# Patient Record
Sex: MALE | Race: WHITE | NOT HISPANIC OR LATINO | Employment: OTHER | URBAN - METROPOLITAN AREA
[De-identification: names, ages, dates, MRNs, and addresses within clinical notes are randomized per-mention and may not be internally consistent; named-entity substitution may affect disease eponyms.]

---

## 2017-02-16 ENCOUNTER — HOSPITAL ENCOUNTER (OUTPATIENT)
Dept: RADIOLOGY | Facility: MEDICAL CENTER | Age: 67
End: 2017-02-16

## 2017-03-09 PROBLEM — J18.9 HOSPITAL-ACQUIRED PNEUMONIA: Status: ACTIVE | Noted: 2017-03-09

## 2017-03-09 PROBLEM — R09.02 HYPOXEMIA: Status: ACTIVE | Noted: 2017-03-09
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
## 2020-07-26 ENCOUNTER — HOSPITAL ENCOUNTER (INPATIENT)
Facility: MEDICAL CENTER | Age: 70
LOS: 1 days | DRG: 184 | End: 2020-07-27
Attending: EMERGENCY MEDICINE | Admitting: SURGERY
Payer: MEDICARE

## 2020-07-26 ENCOUNTER — APPOINTMENT (OUTPATIENT)
Dept: RADIOLOGY | Facility: MEDICAL CENTER | Age: 70
DRG: 184 | End: 2020-07-26
Attending: EMERGENCY MEDICINE
Payer: MEDICARE

## 2020-07-26 ENCOUNTER — APPOINTMENT (OUTPATIENT)
Dept: RADIOLOGY | Facility: MEDICAL CENTER | Age: 70
DRG: 184 | End: 2020-07-26
Payer: MEDICARE

## 2020-07-26 DIAGNOSIS — S09.90XA CLOSED HEAD INJURY, INITIAL ENCOUNTER: ICD-10-CM

## 2020-07-26 DIAGNOSIS — S22.42XA CLOSED FRACTURE OF MULTIPLE RIBS OF LEFT SIDE, INITIAL ENCOUNTER: ICD-10-CM
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
## 2020-07-26 PROBLEM — S06.0X1A CONCUSSION WITH LOSS OF CONSCIOUSNESS OF 30 MINUTES OR LESS: Status: ACTIVE | Noted: 2020-07-26

## 2020-07-26 PROBLEM — Z11.9 SCREENING EXAMINATION FOR INFECTIOUS DISEASE: Status: ACTIVE | Noted: 2020-07-26

## 2020-07-26 PROBLEM — Z53.09 CONTRAINDICATION TO DEEP VEIN THROMBOSIS (DVT) PROPHYLAXIS: Status: ACTIVE | Noted: 2020-07-26

## 2020-07-26 PROBLEM — S22.43XA CLOSED FRACTURE OF MULTIPLE RIBS OF BOTH SIDES: Status: ACTIVE | Noted: 2020-07-26

## 2020-07-26 PROBLEM — T14.90XA TRAUMA: Status: ACTIVE | Noted: 2020-07-26

## 2020-07-26 LAB
ABO GROUP BLD: NORMAL
ALBUMIN SERPL BCP-MCNC: 4.2 G/DL (ref 3.2–4.9)
ALBUMIN/GLOB SERPL: 1.6 G/DL
ALP SERPL-CCNC: 64 U/L (ref 30–99)
ALT SERPL-CCNC: 16 U/L (ref 2–50)
ANION GAP SERPL CALC-SCNC: 13 MMOL/L (ref 7–16)
APTT PPP: 29.6 SEC (ref 24.7–36)
AST SERPL-CCNC: 20 U/L (ref 12–45)
BILIRUB SERPL-MCNC: 0.5 MG/DL (ref 0.1–1.5)
BLD GP AB SCN SERPL QL: NORMAL
BUN SERPL-MCNC: 26 MG/DL (ref 8–22)
CALCIUM SERPL-MCNC: 9 MG/DL (ref 8.5–10.5)
CHLORIDE SERPL-SCNC: 102 MMOL/L (ref 96–112)
CO2 SERPL-SCNC: 22 MMOL/L (ref 20–33)
COVID ORDER STATUS COVID19: NORMAL
CREAT SERPL-MCNC: 0.87 MG/DL (ref 0.5–1.4)
ERYTHROCYTE [DISTWIDTH] IN BLOOD BY AUTOMATED COUNT: 48.7 FL (ref 35.9–50)
ETHANOL BLD-MCNC: <10.1 MG/DL (ref 0–10.1)
GLOBULIN SER CALC-MCNC: 2.7 G/DL (ref 1.9–3.5)
GLUCOSE SERPL-MCNC: 110 MG/DL (ref 65–99)
HCT VFR BLD AUTO: 48.3 % (ref 42–52)
HGB BLD-MCNC: 15.9 G/DL (ref 14–18)
INR PPP: 1.02 (ref 0.87–1.13)
MCH RBC QN AUTO: 32.8 PG (ref 27–33)
MCHC RBC AUTO-ENTMCNC: 32.9 G/DL (ref 33.7–35.3)
MCV RBC AUTO: 99.6 FL (ref 81.4–97.8)
PLATELET # BLD AUTO: 218 K/UL (ref 164–446)
PMV BLD AUTO: 10.1 FL (ref 9–12.9)
POTASSIUM SERPL-SCNC: 4.3 MMOL/L (ref 3.6–5.5)
PROT SERPL-MCNC: 6.9 G/DL (ref 6–8.2)
PROTHROMBIN TIME: 13.7 SEC (ref 12–14.6)
RBC # BLD AUTO: 4.85 M/UL (ref 4.7–6.1)
RH BLD: NORMAL
SARS-COV-2 RNA RESP QL NAA+PROBE: NOTDETECTED
SODIUM SERPL-SCNC: 137 MMOL/L (ref 135–145)
SPECIMEN SOURCE: NORMAL
WBC # BLD AUTO: 8.9 K/UL (ref 4.8–10.8)

## 2020-07-26 PROCEDURE — 80307 DRUG TEST PRSMV CHEM ANLYZR: CPT

## 2020-07-26 PROCEDURE — 72128 CT CHEST SPINE W/O DYE: CPT

## 2020-07-26 PROCEDURE — 700117 HCHG RX CONTRAST REV CODE 255: Performed by: EMERGENCY MEDICINE

## 2020-07-26 PROCEDURE — 74177 CT ABD & PELVIS W/CONTRAST: CPT

## 2020-07-26 PROCEDURE — 99291 CRITICAL CARE FIRST HOUR: CPT

## 2020-07-26 PROCEDURE — 70450 CT HEAD/BRAIN W/O DYE: CPT

## 2020-07-26 PROCEDURE — 80053 COMPREHEN METABOLIC PANEL: CPT

## 2020-07-26 PROCEDURE — 700112 HCHG RX REV CODE 229: Performed by: NURSE PRACTITIONER

## 2020-07-26 PROCEDURE — 700111 HCHG RX REV CODE 636 W/ 250 OVERRIDE (IP): Performed by: EMERGENCY MEDICINE

## 2020-07-26 PROCEDURE — G0390 TRAUMA RESPONS W/HOSP CRITI: HCPCS

## 2020-07-26 PROCEDURE — 770022 HCHG ROOM/CARE - ICU (200)

## 2020-07-26 PROCEDURE — 72131 CT LUMBAR SPINE W/O DYE: CPT

## 2020-07-26 PROCEDURE — 96375 TX/PRO/DX INJ NEW DRUG ADDON: CPT

## 2020-07-26 PROCEDURE — 85027 COMPLETE CBC AUTOMATED: CPT

## 2020-07-26 PROCEDURE — 86850 RBC ANTIBODY SCREEN: CPT

## 2020-07-26 PROCEDURE — 700111 HCHG RX REV CODE 636 W/ 250 OVERRIDE (IP): Performed by: NURSE PRACTITIONER

## 2020-07-26 PROCEDURE — U0003 INFECTIOUS AGENT DETECTION BY NUCLEIC ACID (DNA OR RNA); SEVERE ACUTE RESPIRATORY SYNDROME CORONAVIRUS 2 (SARS-COV-2) (CORONAVIRUS DISEASE [COVID-19]), AMPLIFIED PROBE TECHNIQUE, MAKING USE OF HIGH THROUGHPUT TECHNOLOGIES AS DESCRIBED BY CMS-2020-01-R: HCPCS

## 2020-07-26 PROCEDURE — A9270 NON-COVERED ITEM OR SERVICE: HCPCS | Performed by: NURSE PRACTITIONER

## 2020-07-26 PROCEDURE — 72125 CT NECK SPINE W/O DYE: CPT

## 2020-07-26 PROCEDURE — 85610 PROTHROMBIN TIME: CPT

## 2020-07-26 PROCEDURE — C9803 HOPD COVID-19 SPEC COLLECT: HCPCS | Performed by: NURSE PRACTITIONER

## 2020-07-26 PROCEDURE — 700102 HCHG RX REV CODE 250 W/ 637 OVERRIDE(OP): Performed by: NURSE PRACTITIONER

## 2020-07-26 PROCEDURE — 86900 BLOOD TYPING SEROLOGIC ABO: CPT

## 2020-07-26 PROCEDURE — 96374 THER/PROPH/DIAG INJ IV PUSH: CPT

## 2020-07-26 PROCEDURE — 86901 BLOOD TYPING SEROLOGIC RH(D): CPT

## 2020-07-26 PROCEDURE — 85730 THROMBOPLASTIN TIME PARTIAL: CPT

## 2020-07-26 PROCEDURE — 71045 X-RAY EXAM CHEST 1 VIEW: CPT

## 2020-07-26 PROCEDURE — 700101 HCHG RX REV CODE 250: Performed by: NURSE PRACTITIONER

## 2020-07-26 RX ORDER — POLYETHYLENE GLYCOL 3350 17 G/17G
1 POWDER, FOR SOLUTION ORAL 2 TIMES DAILY
Status: DISCONTINUED | OUTPATIENT
Start: 2020-07-26 | End: 2020-07-27 | Stop reason: HOSPADM

## 2020-07-26 RX ORDER — MORPHINE SULFATE 4 MG/ML
4 INJECTION, SOLUTION INTRAMUSCULAR; INTRAVENOUS ONCE
Status: COMPLETED | OUTPATIENT
Start: 2020-07-26 | End: 2020-07-26

## 2020-07-26 RX ORDER — LATANOPROST 50 UG/ML
1 SOLUTION/ DROPS OPHTHALMIC
Status: DISCONTINUED | OUTPATIENT
Start: 2020-07-26 | End: 2020-07-27 | Stop reason: HOSPADM

## 2020-07-26 RX ORDER — ONDANSETRON 2 MG/ML
4 INJECTION INTRAMUSCULAR; INTRAVENOUS EVERY 4 HOURS PRN
Status: DISCONTINUED | OUTPATIENT
Start: 2020-07-26 | End: 2020-07-27 | Stop reason: HOSPADM

## 2020-07-26 RX ORDER — ACETAMINOPHEN 325 MG/1
650 TABLET ORAL EVERY 6 HOURS
Status: DISCONTINUED | OUTPATIENT
Start: 2020-07-26 | End: 2020-07-27 | Stop reason: HOSPADM

## 2020-07-26 RX ORDER — AMOXICILLIN 250 MG
1 CAPSULE ORAL NIGHTLY
Status: DISCONTINUED | OUTPATIENT
Start: 2020-07-26 | End: 2020-07-27 | Stop reason: HOSPADM

## 2020-07-26 RX ORDER — HYDROMORPHONE HYDROCHLORIDE 1 MG/ML
0.5 INJECTION, SOLUTION INTRAMUSCULAR; INTRAVENOUS; SUBCUTANEOUS
Status: DISCONTINUED | OUTPATIENT
Start: 2020-07-26 | End: 2020-07-27

## 2020-07-26 RX ORDER — OXYCODONE HYDROCHLORIDE 5 MG/1
5 TABLET ORAL
Status: DISCONTINUED | OUTPATIENT
Start: 2020-07-26 | End: 2020-07-27 | Stop reason: HOSPADM

## 2020-07-26 RX ORDER — DOCUSATE SODIUM 100 MG/1
100 CAPSULE, LIQUID FILLED ORAL 2 TIMES DAILY
Status: DISCONTINUED | OUTPATIENT
Start: 2020-07-26 | End: 2020-07-27 | Stop reason: HOSPADM

## 2020-07-26 RX ORDER — METAXALONE 800 MG/1
800 TABLET ORAL 3 TIMES DAILY PRN
Status: DISCONTINUED | OUTPATIENT
Start: 2020-07-26 | End: 2020-07-27 | Stop reason: HOSPADM

## 2020-07-26 RX ORDER — BIMATOPROST 0.1 MG/ML
1 SOLUTION/ DROPS OPHTHALMIC
COMMUNITY

## 2020-07-26 RX ORDER — SODIUM CHLORIDE 9 MG/ML
INJECTION, SOLUTION INTRAVENOUS CONTINUOUS
Status: DISCONTINUED | OUTPATIENT
Start: 2020-07-26 | End: 2020-07-27

## 2020-07-26 RX ORDER — ENEMA 19; 7 G/133ML; G/133ML
1 ENEMA RECTAL
Status: DISCONTINUED | OUTPATIENT
Start: 2020-07-26 | End: 2020-07-27 | Stop reason: HOSPADM

## 2020-07-26 RX ORDER — GABAPENTIN 100 MG/1
100 CAPSULE ORAL 3 TIMES DAILY
Status: DISCONTINUED | OUTPATIENT
Start: 2020-07-27 | End: 2020-07-27 | Stop reason: HOSPADM

## 2020-07-26 RX ORDER — BISACODYL 10 MG
10 SUPPOSITORY, RECTAL RECTAL
Status: DISCONTINUED | OUTPATIENT
Start: 2020-07-26 | End: 2020-07-27 | Stop reason: HOSPADM

## 2020-07-26 RX ORDER — FAMOTIDINE 20 MG/1
20 TABLET, FILM COATED ORAL 2 TIMES DAILY
Status: DISCONTINUED | OUTPATIENT
Start: 2020-07-26 | End: 2020-07-27

## 2020-07-26 RX ORDER — ACETAMINOPHEN 500 MG
500-1000 TABLET ORAL EVERY 8 HOURS PRN
Status: ON HOLD | COMMUNITY
End: 2020-07-27

## 2020-07-26 RX ORDER — LIDOCAINE 50 MG/G
1 PATCH TOPICAL EVERY 24 HOURS
Status: DISCONTINUED | OUTPATIENT
Start: 2020-07-26 | End: 2020-07-27 | Stop reason: HOSPADM

## 2020-07-26 RX ORDER — AMOXICILLIN 250 MG
1 CAPSULE ORAL
Status: DISCONTINUED | OUTPATIENT
Start: 2020-07-26 | End: 2020-07-27 | Stop reason: HOSPADM

## 2020-07-26 RX ADMIN — LATANOPROST 1 DROP: 50 SOLUTION OPHTHALMIC at 19:45

## 2020-07-26 RX ADMIN — ACETAMINOPHEN 650 MG: 325 TABLET, FILM COATED ORAL at 22:00

## 2020-07-26 RX ADMIN — FENTANYL CITRATE 50 MCG: 50 INJECTION INTRAMUSCULAR; INTRAVENOUS at 11:18

## 2020-07-26 RX ADMIN — OXYCODONE 5 MG: 5 TABLET ORAL at 20:00

## 2020-07-26 RX ADMIN — IOHEXOL 100 ML: 350 INJECTION, SOLUTION INTRAVENOUS at 11:54

## 2020-07-26 RX ADMIN — DOCUSATE SODIUM 100 MG: 100 CAPSULE, LIQUID FILLED ORAL at 17:18

## 2020-07-26 RX ADMIN — ACETAMINOPHEN 650 MG: 325 TABLET, FILM COATED ORAL at 15:56

## 2020-07-26 RX ADMIN — MORPHINE SULFATE 4 MG: 4 INJECTION INTRAVENOUS at 13:09

## 2020-07-26 RX ADMIN — LIDOCAINE 1 PATCH: 50 PATCH TOPICAL at 15:57

## 2020-07-26 RX ADMIN — METAXALONE 800 MG: 800 TABLET ORAL at 19:47

## 2020-07-26 RX ADMIN — SENNOSIDES AND DOCUSATE SODIUM 1 TABLET: 8.6; 5 TABLET ORAL at 21:00

## 2020-07-26 RX ADMIN — HYDROMORPHONE HYDROCHLORIDE 0.5 MG: 1 INJECTION, SOLUTION INTRAMUSCULAR; INTRAVENOUS; SUBCUTANEOUS at 22:30

## 2020-07-26 RX ADMIN — OXYCODONE 5 MG: 5 TABLET ORAL at 15:56

## 2020-07-26 ASSESSMENT — COPD QUESTIONNAIRES
DURING THE PAST 4 WEEKS HOW MUCH DID YOU FEEL SHORT OF BREATH: NONE/LITTLE OF THE TIME
HAVE YOU SMOKED AT LEAST 100 CIGARETTES IN YOUR ENTIRE LIFE: NO/DON'T KNOW
COPD SCREENING SCORE: 2
DO YOU EVER COUGH UP ANY MUCUS OR PHLEGM?: NO/ONLY WITH OCCASIONAL COLDS OR INFECTIONS

## 2020-07-26 ASSESSMENT — LIFESTYLE VARIABLES
HAVE PEOPLE ANNOYED YOU BY CRITICIZING YOUR DRINKING: NO
ALCOHOL_USE: YES
TOTAL SCORE: 0
ON A TYPICAL DAY WHEN YOU DRINK ALCOHOL HOW MANY DRINKS DO YOU HAVE: 1
EVER HAD A DRINK FIRST THING IN THE MORNING TO STEADY YOUR NERVES TO GET RID OF A HANGOVER: NO
TOTAL SCORE: 0
AVERAGE NUMBER OF DAYS PER WEEK YOU HAVE A DRINK CONTAINING ALCOHOL: 5
HOW MANY TIMES IN THE PAST YEAR HAVE YOU HAD 5 OR MORE DRINKS IN A DAY: 0
CONSUMPTION TOTAL: NEGATIVE
EVER_SMOKED: NEVER
TOTAL SCORE: 0
EVER_SMOKED: NEVER
EVER FELT BAD OR GUILTY ABOUT YOUR DRINKING: NO
HAVE YOU EVER FELT YOU SHOULD CUT DOWN ON YOUR DRINKING: NO

## 2020-07-26 ASSESSMENT — FIBROSIS 4 INDEX: FIB4 SCORE: 1.58

## 2020-07-26 ASSESSMENT — PAIN DESCRIPTION - DESCRIPTORS: DESCRIPTORS: ACHING

## 2020-07-26 NOTE — PROGRESS NOTES
1523- Patient arrived to SICU rm 117 on monitor with 2 ACLS RNs. Patient settled. Patient pleasant but slightly confused.     Belongings:  -Phone and   -Cowboy boots  -Pocket knife  -Clothing  -IPAD  -Books  -Reading glasses    Skin check:  Scab on right ear  Small scab to top of right foot  Bruising to right temporal region of head  Small red area on RLQ

## 2020-07-26 NOTE — ASSESSMENT & PLAN NOTE
Acute left posterior and lateral 5th rib fractures as well as acute left lateral 3rd and 4th, 6th, and 7th rib fractures. None of these are significantly displaced.  Aggressive multimodal pain management and pulmonary hygiene.  Serial chest radiographs.

## 2020-07-26 NOTE — DISCHARGE PLANNING
Medical Social Work    LSW was notified that pt asking about his wife but pt not able to give wife's contact info. LSW completed [employer] Website search and located pt's wife, Arleth Meeks ([phone]). LSW spoke w/ Arleth who reports that they live 2 hours away and are currently driving to [facility]. Arleth estimates she will be here in an hour.

## 2020-07-26 NOTE — ASSESSMENT & PLAN NOTE
Systemic anticoagulation contraindicated secondary to elevated bleeding risk.  Consider surveillance venous duplex scanning if unable to start Lovenox in 48 hours.

## 2020-07-26 NOTE — ASSESSMENT & PLAN NOTE
Positive loss of consciousness  Head CT negative for acute injury  Repeat head CT unchanged   SLP for cognitive evaluation

## 2020-07-26 NOTE — H&P
Trauma History and Physical  7/26/2020    Attending Physician: Leon Emmanuel MD.     CC: Trauma The patient was triaged as a Trauma Green Transfer in accordance with established pre hospital protols. An expeditious primary and secondary survey with required adjuncts was conducted. See Trauma Narrator for full details.    HPI:  Den Meeks is a very pleasant 69 y.o.male.   He is friendly awake and alert.   He states he has no recollection of the injury or how he got to the hospital.   He states he has been told he fell from horse, he is able to provide history of his previous fall from horse but states has no memory of today  Discussed with the nature of injuries, reviewed films with him, he was unable to retain memory the conversation   He reports pain in his chest.  He states pain is made worse with movement  He states pain does not radiate  He states no headache no change in vision no nausea  He states no abdominal pain.  He states no pain in his extremities.  He states no pain in his neck no numbness tingling weakness in his body.  No past medical history on file.    No past surgical history on file.    Current Facility-Administered Medications   Medication Dose Route Frequency Provider Last Rate Last Dose   • latanoprost (XALATAN) 0.005 % ophthalmic solution 1 Drop  1 Drop Both Eyes QHS ERUM Ortiz.P.N.       • Respiratory Therapy Consult   Nebulization Continuous RT ERUM Ortiz.P.N.       • Pharmacy Consult Request ...Pain Management Review 1 Each  1 Each Other PHARMACY TO DOSE Verona Carrillo A.P.N.       • docusate sodium (COLACE) capsule 100 mg  100 mg Oral BID Verona Carrillo A.P.N.   100 mg at 07/26/20 1718   • senna-docusate (PERICOLACE or SENOKOT S) 8.6-50 MG per tablet 1 Tab  1 Tab Oral Nightly Verona Carrillo A.P.N.       • senna-docusate (PERICOLACE or SENOKOT S) 8.6-50 MG per tablet 1 Tab  1 Tab Oral Q24HRS PRN Verona Carrillo A.P.N.       • polyethylene  glycol/lytes (MIRALAX) PACKET 1 Packet  1 Packet Oral BID Verona Carrillo, A.P.N.       • magnesium hydroxide (MILK OF MAGNESIA) suspension 30 mL  30 mL Oral DAILY Verona Carrillo, A.P.N.       • bisacodyl (DULCOLAX) suppository 10 mg  10 mg Rectal Q24HRS PRN Verona Carrillo, A.P.N.       • fleet enema 133 mL  1 Each Rectal Once PRN Verona Carrillo, A.P.N.       • lidocaine (LIDODERM) 5 % 1 Patch  1 Patch Transdermal Q24HR Verona Carrillo A.P.N.   1 Patch at 07/26/20 1557   • metaxalone (SKELAXIN) tablet 800 mg  800 mg Oral TID PRN Verona Carrillo, A.P.N.       • NS infusion   Intravenous Continuous Verona Carrillo A.P.N.   Stopped at 07/26/20 1415   • oxyCODONE immediate-release (ROXICODONE) tablet 5 mg  5 mg Oral Q3HRS PRN Verona Carrillo, A.P.N.   5 mg at 07/26/20 1556   • HYDROmorphone pf (DILAUDID) injection 0.5 mg  0.5 mg Intravenous Q2HRS PRN Verona Carrillo, A.P.N.       • famotidine (PEPCID) tablet 20 mg  20 mg Oral BID Verona Carrillo, A.P.N.        Or   • famotidine (PEPCID) injection 20 mg  20 mg Intravenous BID Verona Carrillo, A.P.N.       • ondansetron (ZOFRAN) syringe/vial injection 4 mg  4 mg Intravenous Q4HRS PRN Verona Carrillo, A.P.N.       • acetaminophen (TYLENOL) tablet 650 mg  650 mg Oral Q6HRS Verona Carrillo, A.P.N.   650 mg at 07/26/20 1556       Social History     Socioeconomic History   • Marital status:      Spouse name: Not on file   • Number of children: Not on file   • Years of education: Not on file   • Highest education level: Not on file   Occupational History   • Not on file   Social Needs   • Financial resource strain: Not on file   • Food insecurity     Worry: Not on file     Inability: Not on file   • Transportation needs     Medical: Not on file     Non-medical: Not on file   Tobacco Use   • Smoking status: Not on file   Substance and Sexual Activity   • Alcohol use: Not on file   • Drug use: Not on file   • Sexual activity: Not on file  "  Lifestyle   • Physical activity     Days per week: Not on file     Minutes per session: Not on file   • Stress: Not on file   Relationships   • Social connections     Talks on phone: Not on file     Gets together: Not on file     Attends Yazidism service: Not on file     Active member of club or organization: Not on file     Attends meetings of clubs or organizations: Not on file     Relationship status: Not on file   • Intimate partner violence     Fear of current or ex partner: Not on file     Emotionally abused: Not on file     Physically abused: Not on file     Forced sexual activity: Not on file   Other Topics Concern   • Not on file   Social History Narrative   • Not on file       No family history on file.    Allergies:  Patient has no known allergies.    Review of Systems:  Rib fractures, chest pain, review otherwise negative but limited altered mental status  Physical Exam:  BP (!) 181/119   Pulse (!) 58   Temp 36.1 °C (97 °F) (Temporal)   Resp 16   Ht 1.803 m (5' 10.98\")   Wt 89.5 kg (197 lb 5 oz)   SpO2 97%     Constitutional: Awake, alert,  GCS 14. E4 V5 M6.  Head: No cephalohematoma.  No bleeding ears nose or mouth.  Neck: No tracheal deviation. No stridor.  Cardiovascular: Normal rate skin warm brisk capillary refill.  Pulmonary/Chest:chest wall tenderness No crepitus. Positive breath sounds bilaterally.   Abdominal: Soft, nondistended.Nontender to palpation. Pelvis is stable to anterior-posterior compression. No guarding or rebound.  Musculoskeletal: Warm dry moving all no tenderness back: Midline thoracic and lumbar spines are nontender to palpation. No step-offs. Mild sacral erythema present.  Neurological: Friendly cooperative altered mental status GCS 14 perseveration   skin: Skin is warm and dry.  Contusion  abrasions .   Psychiatric:  Normal mood and affect.  Friendly cooperative altered mental status    Labs:  Recent Labs     07/26/20  1116   WBC 8.9   RBC 4.85   HEMOGLOBIN 15.9 "   HEMATOCRIT 48.3   MCV 99.6*   MCH 32.8   MCHC 32.9*   RDW 48.7   PLATELETCT 218   MPV 10.1     Recent Labs     07/26/20  1116   SODIUM 137   POTASSIUM 4.3   CHLORIDE 102   CO2 22   GLUCOSE 110*   BUN 26*   CREATININE 0.87   CALCIUM 9.0     Recent Labs     07/26/20  1116   APTT 29.6   INR 1.02     Recent Labs     07/26/20  1116   ASTSGOT 20   ALTSGPT 16   TBILIRUBIN 0.5   ALKPHOSPHAT 64   GLOBULIN 2.7   INR 1.02       Radiology:  CT-CHEST,ABDOMEN,PELVIS WITH   Final Result      1.  There is no evidence of thoracic aortic injury.   2.  There is no evidence of solid organ injury or bowel injury.   3.  There are acute left posterior and lateral 5th rib fractures as well as acute left lateral 3rd and 4th, 6th, and 7th rib fractures. None of these are significantly displaced.   4.  There is dependent atelectasis.   5.  There is no definite pneumothorax or pleural effusion.   6.  There are probably blebs in the posterior medial aspect of the lung bases, right greater than left versus less likely posttraumatic pneumatocele.      CT-TSPINE W/O PLUS RECONS   Final Result      1.  There is no acute fracture of the thoracic spine. There is multilevel degenerative change throughout the thoracic spine.   2.  There is a questionable nondisplaced acute left posterior 5th rib fracture.   3.  There are old left 7th through 9th rib fractures.      CT-LSPINE W/O PLUS RECONS   Final Result      1.  There is no acute fracture or malalignment of the lumbar spine.   2.  There is multilevel degenerative disc disease and arthropathy with a trace of degenerative L4-5 anterolisthesis.   3.  There is posterior decompression at the L4-5 and L5-S1 levels.      CT-HEAD W/O   Final Result      1.  No acute intracranial abnormality.   2.  Mild atrophy.      CT-CSPINE WITHOUT PLUS RECONS   Final Result      1.  Moderate to severe multilevel degenerative change of cervical spine with associated mild anterolisthesis of C3-4.   2.  No acute cervical  spine fracture.      DX-CHEST-LIMITED (1 VIEW)   Final Result      No evidence for acute intrathoracic injury.            Assessment: Den Meeks is a very pleasant 69 y.o.male.   Chest injury with multiple rib fractures  Altered mental status    Plan:   Active Hospital Problems    Diagnosis   • Concussion with loss of consciousness of 30 minutes or less [S06.0X1A]     Priority: High   • Screening examination for infectious disease [Z11.9]     Priority: Medium   • Contraindication to deep vein thrombosis (DVT) prophylaxis [Z53.09]     Priority: Medium   • Trauma [T14.90XA]     Priority: Low   • Closed fracture of multiple ribs of both sides [S22.43XA]     Priority: Low       Admit ICU frequent neuro checks  Hold Lovenox concern for brain injury follow-up CT scan  Pain control  Will continue to provide encourage and support    monitor neurostatus and comfort level  Adjust medications and comfort measures as needed    Card   monitor for signs of bleeding  Continue to monitor to maintain adequate HR and BP  Follow up labs    Pulm  continue aggressive pulmonary hygiene  Encourage cough deep breath, IS  scd dvt prohylaxis  Follow-up imaging    GI  Nutritional support  Bowel regime  Monitor abdominal exan    Renal    Monitor urine output, fluid balance    Plan for tertiary survey  Critical care time spent 55 minutes          Leon Emmanuel MD, FACS  [facility]  [phone]
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
## 2020-07-27 ENCOUNTER — HOSPITAL ENCOUNTER (OUTPATIENT)
Dept: RADIOLOGY | Facility: MEDICAL CENTER | Age: 70
End: 2020-07-27
Attending: NURSE PRACTITIONER
Payer: MEDICARE

## 2020-07-27 ENCOUNTER — APPOINTMENT (OUTPATIENT)
Dept: RADIOLOGY | Facility: MEDICAL CENTER | Age: 70
DRG: 184 | End: 2020-07-27
Attending: NURSE PRACTITIONER
Payer: MEDICARE

## 2020-07-27 VITALS
HEIGHT: 71 IN | SYSTOLIC BLOOD PRESSURE: 125 MMHG | WEIGHT: 198.63 LBS | OXYGEN SATURATION: 96 % | DIASTOLIC BLOOD PRESSURE: 90 MMHG | TEMPERATURE: 98 F | RESPIRATION RATE: 31 BRPM | BODY MASS INDEX: 27.81 KG/M2 | HEART RATE: 72 BPM

## 2020-07-27 LAB
ALBUMIN SERPL BCP-MCNC: 4 G/DL (ref 3.2–4.9)
ALBUMIN/GLOB SERPL: 1.7 G/DL
ALP SERPL-CCNC: 49 U/L (ref 30–99)
ALT SERPL-CCNC: 20 U/L (ref 2–50)
ANION GAP SERPL CALC-SCNC: 11 MMOL/L (ref 7–16)
AST SERPL-CCNC: 28 U/L (ref 12–45)
BASOPHILS # BLD AUTO: 0.2 % (ref 0–1.8)
BASOPHILS # BLD: 0.02 K/UL (ref 0–0.12)
BILIRUB SERPL-MCNC: 1 MG/DL (ref 0.1–1.5)
BUN SERPL-MCNC: 18 MG/DL (ref 8–22)
CALCIUM SERPL-MCNC: 8.1 MG/DL (ref 8.5–10.5)
CHLORIDE SERPL-SCNC: 104 MMOL/L (ref 96–112)
CO2 SERPL-SCNC: 22 MMOL/L (ref 20–33)
CREAT SERPL-MCNC: 0.76 MG/DL (ref 0.5–1.4)
EKG IMPRESSION: NORMAL
EOSINOPHIL # BLD AUTO: 0.17 K/UL (ref 0–0.51)
EOSINOPHIL NFR BLD: 2.1 % (ref 0–6.9)
ERYTHROCYTE [DISTWIDTH] IN BLOOD BY AUTOMATED COUNT: 50.9 FL (ref 35.9–50)
GLOBULIN SER CALC-MCNC: 2.4 G/DL (ref 1.9–3.5)
GLUCOSE SERPL-MCNC: 105 MG/DL (ref 65–99)
HCT VFR BLD AUTO: 46.8 % (ref 42–52)
HGB BLD-MCNC: 15.3 G/DL (ref 14–18)
IMM GRANULOCYTES # BLD AUTO: 0.03 K/UL (ref 0–0.11)
IMM GRANULOCYTES NFR BLD AUTO: 0.4 % (ref 0–0.9)
LYMPHOCYTES # BLD AUTO: 1.1 K/UL (ref 1–4.8)
LYMPHOCYTES NFR BLD: 13.4 % (ref 22–41)
MAGNESIUM SERPL-MCNC: 1.8 MG/DL (ref 1.5–2.5)
MCH RBC QN AUTO: 33.3 PG (ref 27–33)
MCHC RBC AUTO-ENTMCNC: 32.7 G/DL (ref 33.7–35.3)
MCV RBC AUTO: 102 FL (ref 81.4–97.8)
MONOCYTES # BLD AUTO: 0.69 K/UL (ref 0–0.85)
MONOCYTES NFR BLD AUTO: 8.4 % (ref 0–13.4)
NEUTROPHILS # BLD AUTO: 6.22 K/UL (ref 1.82–7.42)
NEUTROPHILS NFR BLD: 75.5 % (ref 44–72)
NRBC # BLD AUTO: 0 K/UL
NRBC BLD-RTO: 0 /100 WBC
PHOSPHATE SERPL-MCNC: 3.3 MG/DL (ref 2.5–4.5)
PLATELET # BLD AUTO: 192 K/UL (ref 164–446)
PMV BLD AUTO: 10.2 FL (ref 9–12.9)
POTASSIUM SERPL-SCNC: 4 MMOL/L (ref 3.6–5.5)
PROT SERPL-MCNC: 6.4 G/DL (ref 6–8.2)
RBC # BLD AUTO: 4.59 M/UL (ref 4.7–6.1)
SODIUM SERPL-SCNC: 137 MMOL/L (ref 135–145)
WBC # BLD AUTO: 8.2 K/UL (ref 4.8–10.8)

## 2020-07-27 PROCEDURE — 51798 US URINE CAPACITY MEASURE: CPT

## 2020-07-27 PROCEDURE — A9270 NON-COVERED ITEM OR SERVICE: HCPCS | Performed by: NURSE PRACTITIONER

## 2020-07-27 PROCEDURE — 80053 COMPREHEN METABOLIC PANEL: CPT

## 2020-07-27 PROCEDURE — 92523 SPEECH SOUND LANG COMPREHEN: CPT

## 2020-07-27 PROCEDURE — 70450 CT HEAD/BRAIN W/O DYE: CPT

## 2020-07-27 PROCEDURE — 700102 HCHG RX REV CODE 250 W/ 637 OVERRIDE(OP): Performed by: NURSE PRACTITIONER

## 2020-07-27 PROCEDURE — 83735 ASSAY OF MAGNESIUM: CPT

## 2020-07-27 PROCEDURE — 97165 OT EVAL LOW COMPLEX 30 MIN: CPT

## 2020-07-27 PROCEDURE — 97161 PT EVAL LOW COMPLEX 20 MIN: CPT

## 2020-07-27 PROCEDURE — 85025 COMPLETE CBC W/AUTO DIFF WBC: CPT

## 2020-07-27 PROCEDURE — 84100 ASSAY OF PHOSPHORUS: CPT

## 2020-07-27 PROCEDURE — 71045 X-RAY EXAM CHEST 1 VIEW: CPT

## 2020-07-27 PROCEDURE — 93010 ELECTROCARDIOGRAM REPORT: CPT | Performed by: INTERNAL MEDICINE

## 2020-07-27 PROCEDURE — 700112 HCHG RX REV CODE 229: Performed by: NURSE PRACTITIONER

## 2020-07-27 PROCEDURE — 99232 SBSQ HOSP IP/OBS MODERATE 35: CPT | Performed by: SURGERY

## 2020-07-27 PROCEDURE — 700101 HCHG RX REV CODE 250: Performed by: NURSE PRACTITIONER

## 2020-07-27 PROCEDURE — 93005 ELECTROCARDIOGRAM TRACING: CPT | Performed by: NURSE PRACTITIONER

## 2020-07-27 RX ORDER — CELECOXIB 100 MG/1
100 CAPSULE ORAL 2 TIMES DAILY
Status: DISCONTINUED | OUTPATIENT
Start: 2020-07-27 | End: 2020-07-27 | Stop reason: HOSPADM

## 2020-07-27 RX ORDER — LIDOCAINE 50 MG/G
1 PATCH TOPICAL EVERY 24 HOURS
Qty: 14 PATCH | Refills: 0 | Status: SHIPPED | OUTPATIENT
Start: 2020-07-28 | End: 2020-08-11

## 2020-07-27 RX ORDER — OXYCODONE HYDROCHLORIDE 5 MG/1
5 TABLET ORAL
Qty: 30 TAB | Refills: 0 | Status: SHIPPED | OUTPATIENT
Start: 2020-07-27 | End: 2020-08-03

## 2020-07-27 RX ORDER — GABAPENTIN 100 MG/1
100 CAPSULE ORAL 3 TIMES DAILY
Qty: 42 CAP | Refills: 0 | Status: SHIPPED | OUTPATIENT
Start: 2020-07-27 | End: 2020-08-10

## 2020-07-27 RX ORDER — POLYETHYLENE GLYCOL 3350 17 G/17G
17 POWDER, FOR SOLUTION ORAL 2 TIMES DAILY
Refills: 3 | COMMUNITY
Start: 2020-07-27 | End: 2023-02-24

## 2020-07-27 RX ORDER — ACETAMINOPHEN 325 MG/1
650 TABLET ORAL EVERY 6 HOURS
Qty: 30 TAB | Refills: 0 | Status: ON HOLD | OUTPATIENT
Start: 2020-07-27 | End: 2023-03-22

## 2020-07-27 RX ORDER — HYDROMORPHONE HYDROCHLORIDE 1 MG/ML
0.5 INJECTION, SOLUTION INTRAMUSCULAR; INTRAVENOUS; SUBCUTANEOUS EVERY 4 HOURS PRN
Status: DISCONTINUED | OUTPATIENT
Start: 2020-07-27 | End: 2020-07-27 | Stop reason: HOSPADM

## 2020-07-27 RX ORDER — CELECOXIB 100 MG/1
100 CAPSULE ORAL 2 TIMES DAILY
Qty: 28 CAP | Refills: 0 | Status: SHIPPED | OUTPATIENT
Start: 2020-07-27 | End: 2020-08-10

## 2020-07-27 RX ADMIN — OXYCODONE 5 MG: 5 TABLET ORAL at 04:05

## 2020-07-27 RX ADMIN — ACETAMINOPHEN 650 MG: 325 TABLET, FILM COATED ORAL at 12:13

## 2020-07-27 RX ADMIN — CELECOXIB 100 MG: 100 CAPSULE ORAL at 05:44

## 2020-07-27 RX ADMIN — MAGNESIUM HYDROXIDE 30 ML: 400 SUSPENSION ORAL at 05:44

## 2020-07-27 RX ADMIN — OXYCODONE 5 MG: 5 TABLET ORAL at 00:10

## 2020-07-27 RX ADMIN — ACETAMINOPHEN 650 MG: 325 TABLET, FILM COATED ORAL at 06:00

## 2020-07-27 RX ADMIN — GABAPENTIN 100 MG: 100 CAPSULE ORAL at 05:43

## 2020-07-27 RX ADMIN — OXYCODONE 5 MG: 5 TABLET ORAL at 15:19

## 2020-07-27 RX ADMIN — LIDOCAINE 1 PATCH: 50 PATCH TOPICAL at 15:20

## 2020-07-27 RX ADMIN — GABAPENTIN 100 MG: 100 CAPSULE ORAL at 12:13

## 2020-07-27 RX ADMIN — DOCUSATE SODIUM 100 MG: 100 CAPSULE, LIQUID FILLED ORAL at 06:00

## 2020-07-27 ASSESSMENT — GAIT ASSESSMENTS
GAIT LEVEL OF ASSIST: SUPERVISED
DISTANCE (FEET): 500

## 2020-07-27 ASSESSMENT — COGNITIVE AND FUNCTIONAL STATUS - GENERAL
WALKING IN HOSPITAL ROOM: A LITTLE
MOVING FROM LYING ON BACK TO SITTING ON SIDE OF FLAT BED: A LITTLE
DRESSING REGULAR LOWER BODY CLOTHING: A LITTLE
SUGGESTED CMS G CODE MODIFIER MOBILITY: CK
SUGGESTED CMS G CODE MODIFIER DAILY ACTIVITY: CH
MOVING FROM LYING ON BACK TO SITTING ON SIDE OF FLAT BED: A LITTLE
MOVING TO AND FROM BED TO CHAIR: A LITTLE
STANDING UP FROM CHAIR USING ARMS: A LITTLE
TOILETING: A LITTLE
DAILY ACTIVITIY SCORE: 21
MOVING TO AND FROM BED TO CHAIR: A LITTLE
SUGGESTED CMS G CODE MODIFIER DAILY ACTIVITY: CJ
MOBILITY SCORE: 20
HELP NEEDED FOR BATHING: A LITTLE
CLIMB 3 TO 5 STEPS WITH RAILING: A LITTLE
MOBILITY SCORE: 19
CLIMB 3 TO 5 STEPS WITH RAILING: A LITTLE
DAILY ACTIVITIY SCORE: 24
SUGGESTED CMS G CODE MODIFIER MOBILITY: CJ
TURNING FROM BACK TO SIDE WHILE IN FLAT BAD: A LITTLE

## 2020-07-27 ASSESSMENT — ENCOUNTER SYMPTOMS
HEADACHES: 0
NAUSEA: 0
ABDOMINAL PAIN: 0
FEVER: 0
NECK PAIN: 0
BACK PAIN: 0
SENSORY CHANGE: 0
CHILLS: 0
MYALGIAS: 1
DOUBLE VISION: 0
COUGH: 0
VOMITING: 0
PALPITATIONS: 0

## 2020-07-27 ASSESSMENT — PATIENT HEALTH QUESTIONNAIRE - PHQ9
SUM OF ALL RESPONSES TO PHQ9 QUESTIONS 1 AND 2: 0
1. LITTLE INTEREST OR PLEASURE IN DOING THINGS: NOT AT ALL
2. FEELING DOWN, DEPRESSED, IRRITABLE, OR HOPELESS: NOT AT ALL

## 2020-07-27 ASSESSMENT — ACTIVITIES OF DAILY LIVING (ADL): TOILETING: INDEPENDENT

## 2020-07-27 ASSESSMENT — FIBROSIS 4 INDEX: FIB4 SCORE: 2.25

## 2020-07-27 NOTE — CARE PLAN
Problem: Venous Thromboembolism (VTW)/Deep Vein Thrombosis (DVT) Prevention:  Goal: Patient will participate in Venous Thrombosis (VTE)/Deep Vein Thrombosis (DVT)Prevention Measures  Note: SCDs in place and on. Pt mobilizes around the unit one lap, tolerates well.     Problem: Bowel/Gastric:  Goal: Normal bowel function is maintained or improved  Note: Pt reports constipation with narcotic pain medication. Bowel protocol started.

## 2020-07-27 NOTE — CARE PLAN
Problem: Safety  Goal: Free from accidental injury  Outcome: PROGRESSING AS EXPECTED  Intervention:   Pt call light and personal belongings within reach, monitor on and patient’s cardiac rhythm being monitored.   Bed in low position, non skid socks on, bed alarm on.  Pt currently able to communicate hs need for assistance.   Pt near nursing station.    Problem: Knowledge Deficit  Goal:Patient/Significant other demonstrates understanding of disease process, treatment plan, medications and discharge instructions  Outcome: PROGRESSING AS EXPECTED  Interventions:  Plan of care discussed with pt.   Questions regarding IS addressed with pt. and family.   Considered cultural and educational background when teaching care plan.   Questions on plan of care encouraged throughout the shift.
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
## 2020-07-27 NOTE — THERAPY
Occupational Therapy   Initial Evaluation     Patient Name: Den Meeks  Age:  69 y.o., Sex:  male  Medical Record #: [id number]  Today's Date: 7/27/2020     Precautions:  Fall Risk,   Comments: concussion and rib fx from fall of horse     Assessment  Patient is 69 y.o. male admitted after being bucked off a horse, dx w/rib fractures and a concussion. At this time pt has demonstrated ability to complete ADL's no c/o dizziness or blurred vision, pain is controlled w/no overt c/o fatigue.     Plan  Recommend Occupational Therapy for Evaluation only   Discharge recommendations:  Anticipate that the patient will have no further occupational therapy needs after discharge from the hospital.       Subjective  When can I go home     Objective   07/27/20 1047   Initial Contact Note    Initial Contact Note Order Received and Verified, Evaluation Only - Patient Does Not Require Further Acute Occupational Therapy at this Time.  However, May Benefit from Post Acute Therapy for Higher Level Functional Deficits.   Prior Living Situation   Prior Services None   Housing / Facility 2 Story Apartment / Condo   Steps Into Home 0   Rail Right Rail  (Steps in Home)   Bathroom Set up Walk In Shower   Equipment Owned None   Lives with - Patient's Self Care Capacity Spouse   Comments pt reports so can assist as needed    Prior Level of ADL Function   Self Feeding Independent   Grooming / Hygiene Independent   Bathing Independent   Dressing Independent   Toileting Independent   Prior Level of IADL Function   Medication Management Independent   Laundry Independent   Kitchen Mobility Independent   Finances Independent   Home Management Independent   Shopping Independent   Prior Level Of Mobility Independent Without Device in Community   Driving / Transportation Driving Independent   Occupation (Pre-Hospital Vocational) Requires Physical Labor   Cognition    Cognition / Consciousness WDL   Speech/ Communication Hard of Hearing   Level of  Consciousness Alert   Comments cooperative and motivated    Coordination Upper Body   Coordination WDL   Bed Mobility    Supine to Sit Supervised   Sit to Supine Supervised   Comments bed mob w/o use of rail    ADL Assessment   Grooming Supervision;Standing   Upper Body Dressing Supervision   Lower Body Dressing Supervision   Toileting Supervision   Functional Mobility   Sit to Stand Supervised   Bed, Chair, Wheelchair Transfer Supervised   Mobility walking in room no AD    Problem List   Problem List None   Anticipated Discharge Equipment   DC Equipment None

## 2020-07-27 NOTE — PROGRESS NOTES
"Trauma Progress Note     Briefly, this is a 69 y.o. male with a concussion and rib fractures after being bucked off a horse.    /82   Pulse 64   Temp 36.7 °C (98 °F) (Temporal)   Resp 14   Ht 1.803 m (5' 10.98\")   Wt 89.5 kg (197 lb 5 oz)   SpO2 94%   BMI 27.53 kg/m²       Intake/Output Summary (Last 24 hours) at 7/26/2020 1939  Last data filed at 7/26/2020 1600  Gross per 24 hour   Intake 240 ml   Output 0 ml   Net 240 ml       Lab Results   Component Value Date/Time    WBC 8.9 07/26/2020 11:16 AM    RBC 4.85 07/26/2020 11:16 AM    HEMOGLOBIN 15.9 07/26/2020 11:16 AM    HEMATOCRIT 48.3 07/26/2020 11:16 AM    MCV 99.6 (H) 07/26/2020 11:16 AM    MCH 32.8 07/26/2020 11:16 AM    MCHC 32.9 (L) 07/26/2020 11:16 AM    MPV 10.1 07/26/2020 11:16 AM        Lab Results   Component Value Date/Time    SODIUM 137 07/26/2020 11:16 AM    POTASSIUM 4.3 07/26/2020 11:16 AM    CHLORIDE 102 07/26/2020 11:16 AM    CO2 22 07/26/2020 11:16 AM    GLUCOSE 110 (H) 07/26/2020 11:16 AM    BUN 26 (H) 07/26/2020 11:16 AM    CREATININE 0.87 07/26/2020 11:16 AM        Lab Results   Component Value Date/Time    PROTHROMBTM 13.7 07/26/2020 11:16 AM    INR 1.02 07/26/2020 11:16 AM              Assessment:  AOx4, confusion significantly improved  Pain control adequate, encouraging PO regimen  Discussed plan of care, discharge planning  Bradycardia noted    Additional Plans:  Repeat head CT pending  EKG now, check labs  Gabapentin initiated  Celebrex if CT head remains unremarkable  Wean O2 as able   Continue current plan of care      "

## 2020-07-27 NOTE — PROGRESS NOTES
"TRAUMA TERTIARY SURVEY     Mental status adequate for full examination?: Yes    Spine cleared (radiologically and/or clinically): Yes    PHYSICAL EXAMINATION:  Vitals: Blood Pressure 121/75   Pulse 63   Temperature 36.6 °C (97.9 °F) (Temporal)   Respiration 16   Height 1.803 m (5' 10.98\")   Weight 90.1 kg (198 lb 10.2 oz)   Oxygen Saturation 95%   Body Mass Index 27.72 kg/m²   Constitutional:     General Appearance: appears stated age, is in no apparent distress.  HEENT:     No significant external craniofacial trauma. The pupils are equal, round, and reactive to light bilaterally. The extraocular muscles are intact bilaterally.. The nares and oropharynx are clear. The midface and jaw are stable. No malocclusion is evident.  Neck:    The cervical spine is supple and non tender. Normal range of motion.  Respiratory:   Inspection: Unlabored respirations, no intercostal retractions, paradoxical motion, or accessory muscle use.   Palpation:  The chest is tender to compression on the left. The clavicles are non deformed bilaterally..   Auscultation: normal, clear to auscultation.  Cardiovascular:   Auscultation: normal and regular rate and rhythm.   Peripheral Pulses: Normal.   Abdomen:   Abdomen is soft, nontender, without organomegaly or masses.  Musculoskeletal:   The pelvis is stable.  No significant angulation, deformity, or soft tissue injury involving the upper and lower extremities. Normal range of motion.   Back:   The thoracolumbar spine was examined. Examination is remarkable for no significant tenderness, swelling, or deformity in the thoracolumbar region.  Skin:   The skin is warm and dry.  Neurologic:    [place] Coma Scale (GCS) 15. Neurologic examination revealed no focal deficits noted, mental status intact.  Psychiatric:   The patient does not appear depressed or anxious.    IMAGING:  DX-CHEST-PORTABLE (1 VIEW)   Final Result         1.  Pulmonary edema and/or infiltrates.   2.  Cardiomegaly    "   CT-HEAD W/O   Final Result         1.  No acute intracranial abnormality is identified, there are nonspecific white matter changes, commonly associated with small vessel ischemic disease.  Associated mild cerebral atrophy is noted.      CT-CHEST,ABDOMEN,PELVIS WITH   Final Result      1.  There is no evidence of thoracic aortic injury.   2.  There is no evidence of solid organ injury or bowel injury.   3.  There are acute left posterior and lateral 5th rib fractures as well as acute left lateral 3rd and 4th, 6th, and 7th rib fractures. None of these are significantly displaced.   4.  There is dependent atelectasis.   5.  There is no definite pneumothorax or pleural effusion.   6.  There are probably blebs in the posterior medial aspect of the lung bases, right greater than left versus less likely posttraumatic pneumatocele.      CT-TSPINE W/O PLUS RECONS   Final Result      1.  There is no acute fracture of the thoracic spine. There is multilevel degenerative change throughout the thoracic spine.   2.  There is a questionable nondisplaced acute left posterior 5th rib fracture.   3.  There are old left 7th through 9th rib fractures.      CT-LSPINE W/O PLUS RECONS   Final Result      1.  There is no acute fracture or malalignment of the lumbar spine.   2.  There is multilevel degenerative disc disease and arthropathy with a trace of degenerative L4-5 anterolisthesis.   3.  There is posterior decompression at the L4-5 and L5-S1 levels.      CT-HEAD W/O   Final Result      1.  No acute intracranial abnormality.   2.  Mild atrophy.      CT-CSPINE WITHOUT PLUS RECONS   Final Result      1.  Moderate to severe multilevel degenerative change of cervical spine with associated mild anterolisthesis of C3-4.   2.  No acute cervical spine fracture.      DX-CHEST-LIMITED (1 VIEW)   Final Result      No evidence for acute intrathoracic injury.        All current laboratory studies/radiology exams reviewed: Yes    Completed  Consultations:  N/A     Pending Consultations:  N/A    Newly Identified Diagnoses and Injuries:  None noted    TOTAL RAP SCORE:  RAP Score Total: 5      ETOH Screening  CAGE Score: 0  Assessment complete date: 7/27/2020

## 2020-07-27 NOTE — PROGRESS NOTES
Trauma / Surgical Daily Progress Note    Date of Service  7/27/2020    Chief Complaint  69 y.o. male admitted 7/26/2020 with rib fractures and a concussion following trauma    Interval Events  New admit to ICU  Pain control adequate  Tertiary survey completed    - Wean 02 as able  - Therapy evaluations pending  - Transfer to [facility]     Review of Systems  Review of Systems   Constitutional: Negative for chills and fever.   Eyes: Negative for double vision.   Respiratory: Negative for cough.    Cardiovascular: Negative for palpitations.   Gastrointestinal: Negative for abdominal pain, nausea and vomiting.   Genitourinary:        Voiding   Musculoskeletal: Positive for myalgias (chest wall). Negative for back pain, joint pain and neck pain.   Neurological: Negative for sensory change and headaches.        Vital Signs  Temp:  [36 °C (96.8 °F)-36.9 °C (98.5 °F)] 36.6 °C (97.9 °F)  Pulse:  [56-74] 63  Resp:  [14-27] 16  BP: (120-181)/() 121/75  SpO2:  [91 %-98 %] 95 %    Physical Exam  Physical Exam  Vitals signs and nursing note reviewed.   Constitutional:       General: He is not in acute distress.     Appearance: He is not toxic-appearing.      Comments: Up in chair   HENT:      Head: Normocephalic.      Nose: Nose normal.      Mouth/Throat:      Mouth: Mucous membranes are moist.   Eyes:      Extraocular Movements: Extraocular movements intact.      Conjunctiva/sclera: Conjunctivae normal.   Neck:      Musculoskeletal: Neck supple.   Cardiovascular:      Rate and Rhythm: Normal rate and regular rhythm.      Pulses: Normal pulses.      Heart sounds: Normal heart sounds.   Pulmonary:      Effort: Pulmonary effort is normal. No respiratory distress.      Breath sounds: Normal breath sounds.      Comments: IS 2500  Chest:      Chest wall: Tenderness present.   Abdominal:      General: There is no distension.      Palpations: Abdomen is soft.      Tenderness: There is no abdominal tenderness.   Musculoskeletal:       Comments: Moves all extremities   Skin:     General: Skin is warm and dry.      Capillary Refill: Capillary refill takes less than 2 seconds.   Neurological:      Mental Status: He is alert and oriented to person, place, and time.   Psychiatric:         Behavior: Behavior normal.         Laboratory  Recent Results (from the past 24 hour(s))   COD - Adult (Type and Screen)    Collection Time: 07/26/20 11:16 AM   Result Value Ref Range    ABO Grouping Only O     Rh Grouping Only POS     Antibody Screen-Cod NEG    DIAGNOSTIC ALCOHOL    Collection Time: 07/26/20 11:16 AM   Result Value Ref Range    Diagnostic Alcohol <10.1 0.0 - 10.1 mg/dL   Comp Metabolic Panel    Collection Time: 07/26/20 11:16 AM   Result Value Ref Range    Sodium 137 135 - 145 mmol/L    Potassium 4.3 3.6 - 5.5 mmol/L    Chloride 102 96 - 112 mmol/L    Co2 22 20 - 33 mmol/L    Anion Gap 13.0 7.0 - 16.0    Glucose 110 (H) 65 - 99 mg/dL    Bun 26 (H) 8 - 22 mg/dL    Creatinine 0.87 0.50 - 1.40 mg/dL    Calcium 9.0 8.5 - 10.5 mg/dL    AST(SGOT) 20 12 - 45 U/L    ALT(SGPT) 16 2 - 50 U/L    Alkaline Phosphatase 64 30 - 99 U/L    Total Bilirubin 0.5 0.1 - 1.5 mg/dL    Albumin 4.2 3.2 - 4.9 g/dL    Total Protein 6.9 6.0 - 8.2 g/dL    Globulin 2.7 1.9 - 3.5 g/dL    A-G Ratio 1.6 g/dL   CBC WITHOUT DIFFERENTIAL    Collection Time: 07/26/20 11:16 AM   Result Value Ref Range    WBC 8.9 4.8 - 10.8 K/uL    RBC 4.85 4.70 - 6.10 M/uL    Hemoglobin 15.9 14.0 - 18.0 g/dL    Hematocrit 48.3 42.0 - 52.0 %    MCV 99.6 (H) 81.4 - 97.8 fL    MCH 32.8 27.0 - 33.0 pg    MCHC 32.9 (L) 33.7 - 35.3 g/dL    RDW 48.7 35.9 - 50.0 fL    Platelet Count 218 164 - 446 K/uL    MPV 10.1 9.0 - 12.9 fL   Prothrombin Time    Collection Time: 07/26/20 11:16 AM   Result Value Ref Range    PT 13.7 12.0 - 14.6 sec    INR 1.02 0.87 - 1.13   APTT    Collection Time: 07/26/20 11:16 AM   Result Value Ref Range    APTT 29.6 24.7 - 36.0 sec   ESTIMATED GFR    Collection Time: 07/26/20 11:16 AM    Result Value Ref Range    GFR If African American >60 >60 mL/min/1.73 m 2    GFR If Non African American >60 >60 mL/min/1.73 m 2   COVID/SARS CoV-2 PCR    Collection Time: 20  1:35 PM    Specimen: Nasopharyngeal; Respirate   Result Value Ref Range    COVID Order Status Received    SARS-CoV-2, PCR (In-House)    Collection Time: 20  1:35 PM   Result Value Ref Range    SARS-CoV-2 Source NP Swab     SARS-CoV-2 by PCR NotDetected    EKG    Collection Time: 20 12:14 AM   Result Value Ref Range    Report       [facility]    Test Date:  2020  Pt Name:    NAVIN SOUZA               Department: 19  MRN:        [id number]                      Room:       [facility]  Gender:     Male                         Technician: BALDEMAR  :        1950                   Requested By:BAIRON ESPOSITO  Order #:    [id number]                    Reading MD: Cem Rosa MD    Measurements  Intervals                                Axis  Rate:       55                           P:          4  ID:         [id number]                          QRS:        -57  QRSD:       112                          T:          -33  QT:         476  QTc:        456    Interpretive Statements  SINUS BRADYCARDIA  BORDERLINE IVCD WITH LAD  INFERIOR INFARCT, OLD  BASELINE WANDER IN LEAD(S) I,III,aVR,aVL,aVF,V1,V2  No previous ECG available for comparison  Electronically Signed On 2020 5:26:51 PDT by Cem Rosa MD     CBC with Differential: Tomorrow AM    Collection Time: 20  5:45 AM   Result Value Ref Range    WBC 8.2 4.8 - 10.8 K/uL    RBC 4.59 (L) 4.70 - 6.10 M/uL    Hemoglobin 15.3 14.0 - 18.0 g/dL    Hematocrit 46.8 42.0 - 52.0 %    .0 (H) 81.4 - 97.8 fL    MCH 33.3 (H) 27.0 - 33.0 pg    MCHC 32.7 (L) 33.7 - 35.3 g/dL    RDW 50.9 (H) 35.9 - 50.0 fL    Platelet Count 192 164 - 446 K/uL    MPV 10.2 9.0 - 12.9 fL    Neutrophils-Polys 75.50 (H) 44.00 - 72.00 %    Lymphocytes 13.40 (L) 22.00 - 41.00 %    Monocytes 8.40  0.00 - 13.40 %    Eosinophils 2.10 0.00 - 6.90 %    Basophils 0.20 0.00 - 1.80 %    Immature Granulocytes 0.40 0.00 - 0.90 %    Nucleated RBC 0.00 /100 WBC    Neutrophils (Absolute) 6.22 1.82 - 7.42 K/uL    Lymphs (Absolute) 1.10 1.00 - 4.80 K/uL    Monos (Absolute) 0.69 0.00 - 0.85 K/uL    Eos (Absolute) 0.17 0.00 - 0.51 K/uL    Baso (Absolute) 0.02 0.00 - 0.12 K/uL    Immature Granulocytes (abs) 0.03 0.00 - 0.11 K/uL    NRBC (Absolute) 0.00 K/uL   Comp Metabolic Panel (CMP): Tomorrow AM    Collection Time: 07/27/20  5:45 AM   Result Value Ref Range    Sodium 137 135 - 145 mmol/L    Potassium 4.0 3.6 - 5.5 mmol/L    Chloride 104 96 - 112 mmol/L    Co2 22 20 - 33 mmol/L    Anion Gap 11.0 7.0 - 16.0    Glucose 105 (H) 65 - 99 mg/dL    Bun 18 8 - 22 mg/dL    Creatinine 0.76 0.50 - 1.40 mg/dL    Calcium 8.1 (L) 8.5 - 10.5 mg/dL    AST(SGOT) 28 12 - 45 U/L    ALT(SGPT) 20 2 - 50 U/L    Alkaline Phosphatase 49 30 - 99 U/L    Total Bilirubin 1.0 0.1 - 1.5 mg/dL    Albumin 4.0 3.2 - 4.9 g/dL    Total Protein 6.4 6.0 - 8.2 g/dL    Globulin 2.4 1.9 - 3.5 g/dL    A-G Ratio 1.7 g/dL   MAGNESIUM    Collection Time: 07/27/20  5:45 AM   Result Value Ref Range    Magnesium 1.8 1.5 - 2.5 mg/dL   PHOSPHORUS    Collection Time: 07/27/20  5:45 AM   Result Value Ref Range    Phosphorus 3.3 2.5 - 4.5 mg/dL   ESTIMATED GFR    Collection Time: 07/27/20  5:45 AM   Result Value Ref Range    GFR If African American >60 >60 mL/min/1.73 m 2    GFR If Non African American >60 >60 mL/min/1.73 m 2       Fluids    Intake/Output Summary (Last 24 hours) at 7/27/2020 1015  Last data filed at 7/27/2020 0600  Gross per 24 hour   Intake 1940 ml   Output 975 ml   Net 965 ml       Core Measures & Quality Metrics  Labs reviewed, Medications reviewed and Radiology images reviewed  Gomez catheter: No Gomez      DVT Prophylaxis: Not indicated at this time, ambulatory  DVT prophylaxis - mechanical: SCDs  Ulcer prophylaxis: Not indicated    Assessed for rehab:  Patient returned to prior level of function, rehabilitation not indicated at this time    RAP Score Total: 5    ETOH Screening  CAGE Score: 0  Assessment complete date: 7/27/2020        Assessment/Plan  Concussion with loss of consciousness of 30 minutes or less- (present on admission)  Assessment & Plan  Positive loss of consciousness  Head CT negative for acute injury  Repeat head CT unchanged   SLP for cognitive evaluation     Contraindication to deep vein thrombosis (DVT) prophylaxis- (present on admission)  Assessment & Plan  Systemic anticoagulation contraindicated secondary to elevated bleeding risk.  Consider surveillance venous duplex scanning if unable to start Lovenox in 48 hours.    Screening examination for infectious disease- (present on admission)  Assessment & Plan  Admission SARS-CoV-2 testing negative. LOW RISK patient. Repeat SARS-CoV-2 testing not indicated. Isolation precautions de-escalated.    Closed fracture of multiple ribs of both sides- (present on admission)  Assessment & Plan  Acute left posterior and lateral 5th rib fractures as well as acute left lateral 3rd and 4th, 6th, and 7th rib fractures. None of these are significantly displaced.  Aggressive multimodal pain management and pulmonary hygiene.  Serial chest radiographs.    Trauma- (present on admission)  Assessment & Plan  Bucked off horse.  Trauma Green Activation.  Leon Emmanuel MD. Trauma Surgery.        Discussed patient condition with RN, Patient and trauma surgery, Dr. Salas.  Ambulating   Minimal O2 requirements  Good respiratory mechanics   Independent tertiary survey neg   Patient seen, data reviewed and discussed.  Agree with assessment and plan. 'The APN and I have collaborated in the patient assessment chart documentation and care plan. The clinical decision making , diagnoses and management are mine and the APN has assisted in the creation of the clinical document . The APN has no independent billing for this  patient.      I have personally evaluated this patient at the bedside and performed a global high level assessment to allow clinical decision making regarding the patient's risk tolerance for transfer to a lower level of care in this in-house risk environment. This decision and takes into account the patient's current active clinical problems and  Comorbidities. This includes:  Complete neurologic assessment  Assessment of respiratory function considering the need his ongoing therapies and the patient's tolerance'  Cardiovascular status  Coordination of care needs  30 minutes

## 2020-07-27 NOTE — CARE PLAN
Problem: Safety  Goal: Will remain free from injury  Outcome: PROGRESSING AS EXPECTED  Note: Patient oriented to surroundings, belongings and call light within reach, bed in low and locked position, bed alarm in use, patient in room near nurses station     Problem: Pain Management  Goal: Pain level will decrease to patient's comfort goal  Outcome: PROGRESSING AS EXPECTED  Note: Pain assessed regularly, repositioned for pain alleviation, medicated per MAR

## 2020-07-27 NOTE — THERAPY
Physical Therapy   Initial Evaluation     Patient Name: Den Meeks  Age:  69 y.o., Sex:  male  Medical Record #: [id number]  Today's Date: 7/27/2020     Precautions: (bucked off horse, concussion and rib fractures)    Assessment  Patient is 69 y.o. male that presented to acute after being bucked off his horse and with concussion and L rib fractures. He presented to PT with L sided flank pain but otherwise appears to be near baseline functional mobility. He ambulated approximately 500ft with no AD with supervision and ascended a flight of stairs with reciprocal gait, rail, and supervision. He reported no concerns regarding mobility or returning home following medical DC. Patient will not be actively followed for physical therapy services at this time, however may be seen if requested by physician for 1 more visit within 30 days to address any discharge or equipment needs    Plan  Recommend Physical Therapy for Evaluation only  Discharge recommendations:  Anticipate that the patient will have no further physical therapy needs after discharge from the hospital.    Subjective  Agreeable     Objective   07/27/20 1037   Prior Living Situation   Prior Services None   Housing / Facility 2 Story Apartment / Condo   Steps Into Home 0   Steps In Home   (flight)   Rail Right Rail  (Steps in Home)   Equipment Owned None   Lives with - Patient's Self Care Capacity Spouse   Comments Patient reported he lives in apartment he built in his barn. His wife is in good health and assists with IADLs at baseline   Prior Level of Functional Mobility   Bed Mobility Independent   Transfer Status Independent   Ambulation Independent   Distance Ambulation (Feet)   (community)   Assistive Devices Used None   Stairs Independent   Comments very active PTA   History of Falls   History of Falls Yes  (reason for admit, off of horse)   Cognition    Cognition / Consciousness X   Speech/ Communication Hard of Hearing   Level of Consciousness Alert    Comments pleasant, cooperative   Balance Assessment   Sitting Balance (Static) Good   Sitting Balance (Dynamic) Good   Standing Balance (Static) Fair +   Standing Balance (Dynamic) Fair +   Gait Analysis   Gait Level Of Assist Supervised   Assistive Device None   Distance (Feet) 500   # of Times Distance was Traveled 1   Deviation   (increased lateral sway, patient states this is baseline)   # of Stairs Climbed 20   Level of Assist with Stairs Supervised   Bed Mobility    Supine to Sit   (NT, in chair pre and post, appears capable)   Scooting Supervised  (seated)   Functional Mobility   Sit to Stand Supervised   Transfer Method Stand Step   Comments some pain with standing when using LUE for support/to push up   Anticipated Discharge Equipment   DC Equipment None

## 2020-07-28 NOTE — DISCHARGE INSTRUCTIONS
Discharge Instructions    Discharged to home by car with relative. Discharged via wheelchair, hospital escort: Yes.  Special equipment needed: Not Applicable    Be sure to schedule a follow-up appointment with your primary care doctor or any specialists as instructed.     Discharge Plan:    - Follow up with Dr. Emmanuel in 1 weeks time   - Follow up with primary care provider within one weeks time   - Resume regular diet   - May take over the counter acetaminophen or ibuprofen (if unable to obtain Celebrex) as needed for pain   - Continue daily over the counter stool softener while on narcotics   - No operation of machinery or motorized vehicles while under the influence of narcotics   - No alcohol, marijuana or illicit drug use while under the influence of narcotics   - No swimming, hot tubs, baths or wound submersion until cleared by outpatient provider. May shower   - No contact sports, strenuous activities, or heavy lifting until cleared by outpatient provider   - If respiratory decompensation, increased pain, or signs or symptoms of infection occur seek medical attention     I understand that a diet low in cholesterol, fat, and sodium is recommended for good health. Unless I have been given specific instructions below for another diet, I accept this instruction as my diet prescription.   Other diet: REGULAR    Special Instructions: None    · Is patient discharged on Warfarin / Coumadin?   No     Depression / Suicide Risk    As you are discharged from this [facility] facility, it is important to learn how to keep safe from harming yourself.    Recognize the warning signs:  · Abrupt changes in personality, positive or negative- including increase in energy   · Giving away possessions  · Change in eating patterns- significant weight changes-  positive or negative  · Change in sleeping patterns- unable to sleep or sleeping all the time   · Unwillingness or inability to communicate  · Depression  · Unusual sadness,  discouragement and loneliness  · Talk of wanting to die  · Neglect of personal appearance   · Rebelliousness- reckless behavior  · Withdrawal from people/activities they love  · Confusion- inability to concentrate     If you or a loved one observes any of these behaviors or has concerns about self-harm, here's what you can do:  · Talk about it- your feelings and reasons for harming yourself  · Remove any means that you might use to hurt yourself (examples: pills, rope, extension cords, firearm)  · Get professional help from the community (Mental Health, Substance Abuse, psychological counseling)  · Do not be alone:Call your Safe Contact- someone whom you trust who will be there for you.  · Call your local CRISIS HOTLINE 243-4615 or 055-099-8025  · Call your local Children's Mobile Crisis Response Team [place] (012) 899-1124 or www.[employer]  · Call the toll free National Suicide Prevention Hotlines   · National Suicide Prevention Lifeline 695-592-LDCT (8447)  · [employer] Line Network 800-SUICIDE (864-8391)      Rib Fracture    A rib fracture is a break or crack in one of the bones of the ribs. The ribs are like a cage that goes around your upper chest. A broken or cracked rib is often painful, but most do not cause other problems. Most rib fractures usually heal on their own in 1-3 months.  Follow these instructions at home:  Managing pain, stiffness, and swelling  · If directed, apply ice to the injured area.  ? Put ice in a plastic bag.  ? Place a towel between your skin and the bag.  ? Leave the ice on for 20 minutes, 2-3 times a day.  · Take over-the-counter and prescription medicines only as told by your doctor.  Activity  · Avoid activities that cause pain to the injured area. Protect your injured area.  · Slowly increase activity as told by your doctor.  General instructions  · Do deep breathing as told by your doctor. You may be told to:  ? Take deep breaths many times a day.  ? Cough many  times a day while hugging a pillow.  ? Use a device (incentive spirometer) to do deep breathing many times a day.  · Drink enough fluid to keep your pee (urine) clear or pale yellow.  · Do not wear a rib belt or binder. These do not allow you to breathe deeply.  · Keep all follow-up visits as told by your doctor. This is important.  Contact a doctor if:  · You have a fever.  Get help right away if:  · You have trouble breathing.  · You are short of breath.  · You cannot stop coughing.  · You cough up thick or bloody spit (sputum).  · You feel sick to your stomach (nauseous), throw up (vomit), or have belly (abdominal) pain.  · Your pain gets worse and medicine does not help.  Summary  · A rib fracture is a break or crack in one of the bones of the ribs.  · Apply ice to the injured area and take medicines for pain as told by your doctor.  · Take deep breaths and cough many times a day. Hug a pillow every time you cough.  This information is not intended to replace advice given to you by your health care provider. Make sure you discuss any questions you have with your health care provider.  Document Released: 09/26/2009 Document Revised: 11/30/2018 Document Reviewed: 03/20/2018  [employer] Patient Education © 2020 [employer]      Oximetry  Oximetry is a technology that measures the oxygen level in the blood. This measurement is taken with a device called an oximeter. This device may also measure the heart rate (pulse). The measurement helps to assess:  · A person's oxygen level and breathing.  · The need or effectiveness of oxygen therapy or other treatments for lung disease.  · A person's ability to tolerate increased activity.  If a more accurate measurement is required, a blood sample will be taken.  How is an oximetry reading obtained?    · A tape sensor or clip with a light source and a light detector will be placed on an area of the body:  ? For children and adults, the sensor is usually placed on areas such as  a finger, earlobe, or toe.  ? For infants, a tape sensor is usually placed around areas such as the sole of a foot or the palm of a hand.  · The device will beam light through the skin and blood. This cannot be felt.  · The levels of light received by the detector will be measured and the percentage of blood cells carrying oxygen will be calculated.  Oximetry may be used continuously or may be used at specified intervals.  Are there any risks associated with oximetry?  The risks associated with oximetry are rare. However, there is a risk of skin breakdown if a sensor is left in the same location for long periods of time.  What can affect the accuracy of the oximetry reading?  Certain factors or conditions can affect the accuracy of the measurements. They include:  · Extreme warmth or coolness of the area where the sensor is located.  · Excessive sweating of the area where the sensor is located.  · Shivering or too much movement.  · Poor blood flow to the area where the sensor is located.  · Low hemoglobin or red blood cell levels (anemia).  · A bone marrow disease that causes high levels of red blood cells, white blood cells, and platelets (polycythemia vera).  · Chronic smoking and recent inhalation of smoke or carbon monoxide.  · Bright, artificial lighting.  · Nail polish or artificial nails.  · Very dark skin.  What is the meaning of the oximetry reading?  The normal value depends upon your medical history and your elevation above sea level.  · Normal oxygen saturation levels are 90% or higher. Most healthy people have oxygen saturation levels between 95% and 100%.  · Low oxygen saturation levels are below 90%. This may happen in people with lung conditions, such as chronic obstructive pulmonary disease (COPD). In this case, supplemental oxygen therapy may be needed.  Summary  · Oximetry uses a small device to measure the oxygen level in the blood.  · The light in the sensor cannot be felt. The risks associated  with oximetry are rare.  · Normal oxygen levels are 90% or higher. Most healthy people have oxygen levels between 95% and 100%.  · People with low oxygen levels may need supplemental oxygen.  This information is not intended to replace advice given to you by your health care provider. Make sure you discuss any questions you have with your health care provider.  Document Released: 03/08/2006 Document Revised: 04/09/2020 Document Reviewed: 12/21/2017  "[employer]" Patient Education © 2020 "[employer]" Inc.      How To Use an Incentive Spirometer  An incentive spirometer is a tool that measures how well you are filling your lungs with each breath. Learning to take long, deep breaths using this tool can help you keep your lungs clear and active. This may help to reverse or lessen your chance of developing breathing (pulmonary) problems, especially infection. You may be asked to use a spirometer:  · After a surgery.  · If you have a lung problem or a history of smoking.  · After a long period of time when you have been unable to move or be active.  If the spirometer includes an indicator to show the highest number that you have reached, your health care provider or respiratory therapist will help you set a goal. Keep a list (log) of your progress as told by your health care provider.  What are the risks?  · Breathing too quickly may cause dizziness or cause you to pass out. Take your time so you do not get dizzy or light-headed.  · If you are in pain, you may need to take pain medicine before doing incentive spirometry. It is harder to take a deep breath if you are having pain.  How to use your incentive spirometer    1. Sit up on the edge of your bed or on a chair.  2. Hold the incentive spirometer so that it is in an upright position.  3. Before you use the spirometer, breathe out normally.  4. Place the mouthpiece in your mouth. Make sure your lips are closed tightly around it.  5. Breathe in slowly and as deeply as you can  through your mouth, causing the piston or the ball to rise toward the top of the chamber.  6. Hold your breath for 3-5 seconds, or for as long as possible.  ? If the spirometer includes a  indicator, use this to guide you in breathing. Slow down your breathing if the indicator goes above the marked areas.  7. Remove the mouthpiece from your mouth and breathe out normally. The piston or ball will return to the bottom of the chamber.  8. Rest for a few seconds, then repeat the steps 10 or more times.  ? Take your time and take a few normal breaths between deep breaths so that you do not get dizzy or light-headed.  ? Do this every 1-2 hours when you are awake.  9. If the spirometer includes a goal marker to show the highest number you have reached (best effort), use this as a goal to work toward during each repetition.  10. After each set of 10 deep breaths, cough a few times. This will help to make sure that your lungs are clear.  ? If you have an incision on your chest or abdomen from surgery, place a pillow or a rolled-up towel firmly against the incision when you cough. This can help to reduce pain from coughing.  General tips  · When you become able to get out of bed, walk around often and continue to cough to help clear your lungs.  · Keep using the incentive spirometer until your health care provider says it is okay to stop using it. If you have been in the hospital, you may be told to keep using the spirometer at home.  Contact a health care provider if:  · You are having difficulty using the spirometer.  · You have trouble using the spirometer as often as instructed.  · Your pain medicine is not giving enough relief for you to use the spirometer as told.  · You have a fever.  · You develop shortness of breath.  Get help right away if:  · You develop a cough with bloody mucus from the lungs (bloody sputum).  · You have fluid or blood coming from an incision site after you cough.  Summary  · An incentive  spirometer is a tool that can help you learn to take long, deep breaths to keep your lungs clear and active.  · You may be asked to use a spirometer after a surgery, if you have a lung problem or a history of smoking, or if you have been inactive for a long period of time.  · Use your incentive spirometer as instructed every 1-2 hours while you are awake.  · If you have an incision on your chest or abdomen, place a pillow or a rolled-up towel firmly against your incision when you cough. This will help to reduce pain.  This information is not intended to replace advice given to you by your health care provider. Make sure you discuss any questions you have with your health care provider.  Document Released: 04/29/2008 Document Revised: 01/10/2019 Document Reviewed: 10/31/2018  [employer] Patient Education © 2020 [employer]    Lidocaine dermal patch  What is this medicine?  LIDOCAINE (LYE castañeda jimenez) causes loss of feeling in the skin and surrounding area. The medicine helps treat pain, including nerve pain.  This medicine may be used for other purposes; ask your health care provider or pharmacist if you have questions.  COMMON BRAND NAME(S): Aspercreme with Lidocaine, GEN7T, Lidocare, Lidoderm, ZTlido  What should I tell my health care provider before I take this medicine?  They need to know if you have any of these conditions:  · heart disease  · history of irregular heart beat  · liver disease  · skin conditions or sensitivity  · skin infection  · an unusual or allergic reaction to lidocaine, parabens, other medicines, foods, dyes, or preservatives  · pregnant or trying to get pregnant  · breast-feeding  How should I use this medicine?  This medicine is for external use only. Follow the directions on the prescription label or package.  Talk to your pediatrician regarding the use of this medicine in children. Special care may be needed.  Overdosage: If you think you have taken too much of this medicine contact a  poison control center or emergency room at once.  NOTE: This medicine is only for you. Do not share this medicine with others.  What if I miss a dose?  If you miss a dose, take it as soon as you can. If it is almost time for your next dose, take only that dose. Do not take double or extra doses.  What may interact with this medicine?  Do not take this medicine with any of the following medications:  · certain medicines for irregular heart beat  · MAOIs like Carbex, Eldepryl, Marplan, Nardil, and Parnate  This medicine may also interact with the following medications:  · other local anesthetics like pramoxine, tetracaine  Do not use any other skin products on the affected area without asking your doctor or health care professional.  This list may not describe all possible interactions. Give your health care provider a list of all the medicines, herbs, non-prescription drugs, or dietary supplements you use. Also tell them if you smoke, drink alcohol, or use illegal drugs. Some items may interact with your medicine.  What should I watch for while using this medicine?  Tell your doctor or healthcare professional if your symptoms do not start to get better or if they get worse.  Be careful to avoid injury while the area is numb from the medicine, and you are not aware of pain.  If you are going to need surgery, a MRI, CT scan, or other procedure, tell your doctor that you are using this medicine. You may need to remove this patch before the procedure.  Do not get this medicine in your eyes. If you do, rinse out with plenty of cool tap water.  This medicine can make certain skin conditions worse. Only use it for conditions for which your doctor or health care professional has prescribed.  What side effects may I notice from receiving this medicine?  Side effects that you should report to your doctor or health care professional as soon as possible:  · allergic reactions like skin rash, itching or hives, swelling of the  face, lips, or tongue  · breathing problems  · chest pain or chest tightness  · dizzines  Side effects that usually do not require medical attention (report to your doctor or health care professional if they continue or are bothersome):  · tingling, numbness at site where applied  This list may not describe all possible side effects. Call your doctor for medical advice about side effects. You may report side effects to FDA at 7-158-SIZ-3924.  Where should I keep my medicine?  Keep out of the reach of children.  See product for storage instructions. Each product may have different instructions.  NOTE: This sheet is a summary. It may not cover all possible information. If you have questions about this medicine, talk to your doctor, pharmacist, or health care provider.  © 2020 [employer] (2018-09-19 22:50:48)

## 2020-07-28 NOTE — PROGRESS NOTES
Pt being discharged via wheelchair to his wife down in the lobby. I will be providing education for her as well as the patient so they can both hear the information. All personal belongings were sent with the wife including his hearing aid chargers and other phone and ipad chargers, ipad and clothes. The only things the patient will be leaving with is his phone, clothing, cell phone, hearing aids and neck hearing aid .     Awaiting wife to call back when she arrives to [facility].

## 2022-12-21 ENCOUNTER — TELEPHONE (OUTPATIENT)
Dept: HEALTH INFORMATION MANAGEMENT | Facility: OTHER | Age: 72
End: 2022-12-21
Payer: MEDICARE

## 2023-12-08 ENCOUNTER — APPOINTMENT (OUTPATIENT)
Dept: ADMISSIONS | Facility: MEDICAL CENTER | Age: 73
End: 2023-12-08
Attending: STUDENT IN AN ORGANIZED HEALTH CARE EDUCATION/TRAINING PROGRAM
Payer: MEDICARE

## 2023-12-15 ENCOUNTER — PRE-ADMISSION TESTING (OUTPATIENT)
Dept: ADMISSIONS | Facility: MEDICAL CENTER | Age: 73
End: 2023-12-15
Attending: STUDENT IN AN ORGANIZED HEALTH CARE EDUCATION/TRAINING PROGRAM
Payer: MEDICARE

## 2023-12-15 VITALS — BODY MASS INDEX: 29.71 KG/M2 | HEIGHT: 71 IN

## 2023-12-15 DIAGNOSIS — Z01.812 PRE-OPERATIVE LABORATORY EXAMINATION: ICD-10-CM

## 2023-12-15 DIAGNOSIS — Z01.810 PRE-OPERATIVE CARDIOVASCULAR EXAMINATION: ICD-10-CM

## 2023-12-15 RX ORDER — ASPIRIN 81 MG/1
TABLET ORAL DAILY
COMMUNITY
End: 2023-12-15

## 2023-12-15 RX ORDER — MONTELUKAST SODIUM 10 MG/1
1 TABLET ORAL EVERY EVENING
COMMUNITY
End: 2023-12-15

## 2023-12-15 RX ORDER — OXYCODONE HYDROCHLORIDE AND ACETAMINOPHEN 5; 325 MG/1; MG/1
TABLET ORAL
COMMUNITY
End: 2023-12-15

## 2023-12-15 RX ORDER — BENZONATATE 100 MG/1
CAPSULE ORAL
COMMUNITY
Start: 2023-12-06 | End: 2023-12-15

## 2023-12-15 RX ORDER — NAPROXEN 250 MG/1
250 TABLET ORAL 2 TIMES DAILY WITH MEALS
COMMUNITY

## 2023-12-15 RX ORDER — ACETAMINOPHEN 500 MG
500-1000 TABLET ORAL EVERY 6 HOURS PRN
COMMUNITY

## 2023-12-15 RX ORDER — ASPIRIN 325 MG
TABLET, DELAYED RELEASE (ENTERIC COATED) ORAL
COMMUNITY
End: 2023-12-15

## 2023-12-15 NOTE — PREPROCEDURE INSTRUCTIONS
"Reviewed \"Preparing for your procedure\" via phone.     Confirmed check in location. Confirmed received preadmit Handouts/[email or web address]/Video via email.     Agreed with surgical site listed on consent.     Patient instructed per pharmacy guidelines regarding taking, holding or contacting provider for instructions on regularly prescribed medications before surgery. Instructed to take the following medications the day of surgery with a sip of water per pharmacy medication guidelines: flomax, finasteride  "

## 2023-12-18 ENCOUNTER — APPOINTMENT (OUTPATIENT)
Dept: ADMISSIONS | Facility: MEDICAL CENTER | Age: 73
End: 2023-12-18
Attending: STUDENT IN AN ORGANIZED HEALTH CARE EDUCATION/TRAINING PROGRAM
Payer: MEDICARE

## 2023-12-18 DIAGNOSIS — Z01.812 PRE-OPERATIVE LABORATORY EXAMINATION: ICD-10-CM

## 2023-12-18 DIAGNOSIS — Z01.810 PRE-OPERATIVE CARDIOVASCULAR EXAMINATION: ICD-10-CM

## 2023-12-18 LAB
EKG IMPRESSION: NORMAL
SCCMEC + MECA PNL NOSE NAA+PROBE: NEGATIVE
SCCMEC + MECA PNL NOSE NAA+PROBE: NEGATIVE

## 2023-12-18 PROCEDURE — 87640 STAPH A DNA AMP PROBE: CPT

## 2023-12-18 PROCEDURE — 93010 ELECTROCARDIOGRAM REPORT: CPT | Performed by: STUDENT IN AN ORGANIZED HEALTH CARE EDUCATION/TRAINING PROGRAM

## 2023-12-18 PROCEDURE — 93005 ELECTROCARDIOGRAM TRACING: CPT

## 2023-12-18 PROCEDURE — 87641 MR-STAPH DNA AMP PROBE: CPT

## 2023-12-18 NOTE — DISCHARGE PLANNING
DISCHARGE PLANNING NOTE - TOTAL JOINT    Procedure: Procedure(s):  LEFT TOTAL KNEE ARTHROPLASTY  Procedure Date: 1/10/2024  Insurance: Payor: MEDICARE / Plan: MEDICARE PART A & B / Product Type: *No Product type* /    Equipment currently available at home?  cane, front-wheel walker, shower chair, and will buy ice machine.  Steps into the home? 0  Steps within the home? 1 flight  Toilet height? ADA  Type of shower? walk-in shower  Home Oxygen? No  Portable tank?    Oxygen Provider:  Who will be with you during your recovery? spouse  Is Outpatient Physical Therapy set up after surgery? Yes  Did you take the Total Joint Class and where? Yes, received [facility] book.  Planning same day discharge?Yes     This writer met with pt and educated to preop showers, nasal mrsa swab which was obtained by this writer, and potential for overnight stay. CHG kit given to pt. Home safety checklist and equipment resource guide sent home with pt. Pt educated to dc criteria. All questions answered and verbalizes understanding of all instructions. No dc needs identified at this time. Anticipate dc to home without barriers.

## 2024-01-10 ENCOUNTER — PHARMACY VISIT (OUTPATIENT)
Dept: PHARMACY | Facility: MEDICAL CENTER | Age: 74
End: 2024-01-10
Payer: COMMERCIAL

## 2024-01-10 ENCOUNTER — ANESTHESIA EVENT (OUTPATIENT)
Dept: SURGERY | Facility: MEDICAL CENTER | Age: 74
End: 2024-01-10
Payer: MEDICARE

## 2024-01-10 ENCOUNTER — ANESTHESIA (OUTPATIENT)
Dept: SURGERY | Facility: MEDICAL CENTER | Age: 74
End: 2024-01-10
Payer: MEDICARE

## 2024-01-10 ENCOUNTER — APPOINTMENT (OUTPATIENT)
Dept: RADIOLOGY | Facility: MEDICAL CENTER | Age: 74
End: 2024-01-10
Attending: INTERNAL MEDICINE
Payer: MEDICARE

## 2024-01-10 ENCOUNTER — HOSPITAL ENCOUNTER (OUTPATIENT)
Facility: MEDICAL CENTER | Age: 74
End: 2024-01-11
Attending: STUDENT IN AN ORGANIZED HEALTH CARE EDUCATION/TRAINING PROGRAM | Admitting: STUDENT IN AN ORGANIZED HEALTH CARE EDUCATION/TRAINING PROGRAM
Payer: MEDICARE

## 2024-01-10 ENCOUNTER — APPOINTMENT (OUTPATIENT)
Dept: RADIOLOGY | Facility: MEDICAL CENTER | Age: 74
End: 2024-01-10
Attending: STUDENT IN AN ORGANIZED HEALTH CARE EDUCATION/TRAINING PROGRAM
Payer: MEDICARE

## 2024-01-10 PROCEDURE — 700105 HCHG RX REV CODE 258: Performed by: STUDENT IN AN ORGANIZED HEALTH CARE EDUCATION/TRAINING PROGRAM

## 2024-01-10 PROCEDURE — 160036 HCHG PACU - EA ADDL 30 MINS PHASE I: Performed by: STUDENT IN AN ORGANIZED HEALTH CARE EDUCATION/TRAINING PROGRAM

## 2024-01-10 PROCEDURE — C1713 ANCHOR/SCREW BN/BN,TIS/BN: HCPCS | Performed by: STUDENT IN AN ORGANIZED HEALTH CARE EDUCATION/TRAINING PROGRAM

## 2024-01-10 PROCEDURE — 97162 PT EVAL MOD COMPLEX 30 MIN: CPT

## 2024-01-10 PROCEDURE — 71045 X-RAY EXAM CHEST 1 VIEW: CPT

## 2024-01-10 PROCEDURE — 73560 X-RAY EXAM OF KNEE 1 OR 2: CPT | Mod: LT

## 2024-01-10 PROCEDURE — 770030 HCHG ROOM/CARE - EXTENDED RECOVERY EACH 15 MIN

## 2024-01-10 PROCEDURE — 700111 HCHG RX REV CODE 636 W/ 250 OVERRIDE (IP): Performed by: INTERNAL MEDICINE

## 2024-01-10 PROCEDURE — 160009 HCHG ANES TIME/MIN: Performed by: STUDENT IN AN ORGANIZED HEALTH CARE EDUCATION/TRAINING PROGRAM

## 2024-01-10 PROCEDURE — 700101 HCHG RX REV CODE 250: Performed by: INTERNAL MEDICINE

## 2024-01-10 PROCEDURE — 94760 N-INVAS EAR/PLS OXIMETRY 1: CPT

## 2024-01-10 PROCEDURE — 700102 HCHG RX REV CODE 250 W/ 637 OVERRIDE(OP): Performed by: INTERNAL MEDICINE

## 2024-01-10 PROCEDURE — A9270 NON-COVERED ITEM OR SERVICE: HCPCS | Performed by: INTERNAL MEDICINE

## 2024-01-10 PROCEDURE — C1776 JOINT DEVICE (IMPLANTABLE): HCPCS | Performed by: STUDENT IN AN ORGANIZED HEALTH CARE EDUCATION/TRAINING PROGRAM

## 2024-01-10 PROCEDURE — 160035 HCHG PACU - 1ST 60 MINS PHASE I: Performed by: STUDENT IN AN ORGANIZED HEALTH CARE EDUCATION/TRAINING PROGRAM

## 2024-01-10 PROCEDURE — 64447 NJX AA&/STRD FEMORAL NRV IMG: CPT | Performed by: STUDENT IN AN ORGANIZED HEALTH CARE EDUCATION/TRAINING PROGRAM

## 2024-01-10 PROCEDURE — 502000 HCHG MISC OR IMPLANTS RC 0278: Performed by: STUDENT IN AN ORGANIZED HEALTH CARE EDUCATION/TRAINING PROGRAM

## 2024-01-10 PROCEDURE — 700101 HCHG RX REV CODE 250: Performed by: STUDENT IN AN ORGANIZED HEALTH CARE EDUCATION/TRAINING PROGRAM

## 2024-01-10 PROCEDURE — 700111 HCHG RX REV CODE 636 W/ 250 OVERRIDE (IP): Mod: JZ | Performed by: STUDENT IN AN ORGANIZED HEALTH CARE EDUCATION/TRAINING PROGRAM

## 2024-01-10 PROCEDURE — 700111 HCHG RX REV CODE 636 W/ 250 OVERRIDE (IP): Mod: JZ | Performed by: INTERNAL MEDICINE

## 2024-01-10 PROCEDURE — 160029 HCHG SURGERY MINUTES - 1ST 30 MINS LEVEL 4: Performed by: STUDENT IN AN ORGANIZED HEALTH CARE EDUCATION/TRAINING PROGRAM

## 2024-01-10 PROCEDURE — 160041 HCHG SURGERY MINUTES - EA ADDL 1 MIN LEVEL 4: Performed by: STUDENT IN AN ORGANIZED HEALTH CARE EDUCATION/TRAINING PROGRAM

## 2024-01-10 PROCEDURE — 160048 HCHG OR STATISTICAL LEVEL 1-5: Performed by: STUDENT IN AN ORGANIZED HEALTH CARE EDUCATION/TRAINING PROGRAM

## 2024-01-10 PROCEDURE — 160002 HCHG RECOVERY MINUTES (STAT): Performed by: STUDENT IN AN ORGANIZED HEALTH CARE EDUCATION/TRAINING PROGRAM

## 2024-01-10 PROCEDURE — RXMED WILLOW AMBULATORY MEDICATION CHARGE: Performed by: STUDENT IN AN ORGANIZED HEALTH CARE EDUCATION/TRAINING PROGRAM

## 2024-01-10 DEVICE — IMPLANTABLE DEVICE: Type: IMPLANTABLE DEVICE | Site: KNEE | Status: FUNCTIONAL

## 2024-01-10 DEVICE — CEMENT ORTHOPEDIC HV US  (10/PK): Type: IMPLANTABLE DEVICE | Site: KNEE | Status: FUNCTIONAL

## 2024-01-10 RX ORDER — ACETAMINOPHEN 500 MG
1000 TABLET ORAL ONCE
Status: COMPLETED | OUTPATIENT
Start: 2024-01-10 | End: 2024-01-10

## 2024-01-10 RX ORDER — ONDANSETRON 2 MG/ML
4 INJECTION INTRAMUSCULAR; INTRAVENOUS
Status: DISCONTINUED | OUTPATIENT
Start: 2024-01-10 | End: 2024-01-10 | Stop reason: HOSPADM

## 2024-01-10 RX ORDER — ASPIRIN 81 MG/1
81 TABLET ORAL DAILY
COMMUNITY
Start: 2024-01-01

## 2024-01-10 RX ORDER — SODIUM CHLORIDE, SODIUM LACTATE, POTASSIUM CHLORIDE, CALCIUM CHLORIDE 600; 310; 30; 20 MG/100ML; MG/100ML; MG/100ML; MG/100ML
INJECTION, SOLUTION INTRAVENOUS CONTINUOUS
Status: ACTIVE | OUTPATIENT
Start: 2024-01-10 | End: 2024-01-10

## 2024-01-10 RX ORDER — CELECOXIB 200 MG/1
CAPSULE ORAL
Qty: 14 CAPSULE | Refills: 0 | OUTPATIENT
Start: 2024-01-10

## 2024-01-10 RX ORDER — HYDROMORPHONE HYDROCHLORIDE 2 MG/ML
INJECTION, SOLUTION INTRAMUSCULAR; INTRAVENOUS; SUBCUTANEOUS PRN
Status: DISCONTINUED | OUTPATIENT
Start: 2024-01-10 | End: 2024-01-10 | Stop reason: SURG

## 2024-01-10 RX ORDER — DIPHENHYDRAMINE HYDROCHLORIDE 50 MG/ML
12.5 INJECTION INTRAMUSCULAR; INTRAVENOUS
Status: DISCONTINUED | OUTPATIENT
Start: 2024-01-10 | End: 2024-01-10 | Stop reason: HOSPADM

## 2024-01-10 RX ORDER — DEXAMETHASONE SODIUM PHOSPHATE 4 MG/ML
INJECTION, SOLUTION INTRA-ARTICULAR; INTRALESIONAL; INTRAMUSCULAR; INTRAVENOUS; SOFT TISSUE PRN
Status: DISCONTINUED | OUTPATIENT
Start: 2024-01-10 | End: 2024-01-10 | Stop reason: SURG

## 2024-01-10 RX ORDER — HALOPERIDOL 5 MG/ML
1 INJECTION INTRAMUSCULAR
Status: DISCONTINUED | OUTPATIENT
Start: 2024-01-10 | End: 2024-01-10 | Stop reason: HOSPADM

## 2024-01-10 RX ORDER — LIDOCAINE HYDROCHLORIDE 20 MG/ML
INJECTION, SOLUTION EPIDURAL; INFILTRATION; INTRACAUDAL; PERINEURAL PRN
Status: DISCONTINUED | OUTPATIENT
Start: 2024-01-10 | End: 2024-01-10 | Stop reason: SURG

## 2024-01-10 RX ORDER — OXYCODONE HCL 5 MG/5 ML
5 SOLUTION, ORAL ORAL
Status: COMPLETED | OUTPATIENT
Start: 2024-01-10 | End: 2024-01-10

## 2024-01-10 RX ORDER — KETOROLAC TROMETHAMINE 30 MG/ML
INJECTION, SOLUTION INTRAMUSCULAR; INTRAVENOUS PRN
Status: DISCONTINUED | OUTPATIENT
Start: 2024-01-10 | End: 2024-01-10 | Stop reason: SURG

## 2024-01-10 RX ORDER — CELECOXIB 200 MG/1
400 CAPSULE ORAL ONCE
Status: COMPLETED | OUTPATIENT
Start: 2024-01-10 | End: 2024-01-10

## 2024-01-10 RX ORDER — HYDROMORPHONE HYDROCHLORIDE 1 MG/ML
0.1 INJECTION, SOLUTION INTRAMUSCULAR; INTRAVENOUS; SUBCUTANEOUS
Status: DISCONTINUED | OUTPATIENT
Start: 2024-01-10 | End: 2024-01-10 | Stop reason: HOSPADM

## 2024-01-10 RX ORDER — OXYCODONE HYDROCHLORIDE 5 MG/1
5 TABLET ORAL EVERY 6 HOURS PRN
COMMUNITY
Start: 2024-01-01

## 2024-01-10 RX ORDER — ACETAMINOPHEN 500 MG
TABLET ORAL
Qty: 84 TABLET | Refills: 0 | OUTPATIENT
Start: 2024-01-10

## 2024-01-10 RX ORDER — CEFAZOLIN SODIUM 1 G/3ML
INJECTION, POWDER, FOR SOLUTION INTRAMUSCULAR; INTRAVENOUS PRN
Status: DISCONTINUED | OUTPATIENT
Start: 2024-01-10 | End: 2024-01-10 | Stop reason: SURG

## 2024-01-10 RX ORDER — SODIUM CHLORIDE 9 MG/ML
INJECTION, SOLUTION INTRAMUSCULAR; INTRAVENOUS; SUBCUTANEOUS
Status: DISCONTINUED | OUTPATIENT
Start: 2024-01-10 | End: 2024-01-10 | Stop reason: HOSPADM

## 2024-01-10 RX ORDER — OXYCODONE HYDROCHLORIDE 5 MG/1
TABLET ORAL
Qty: 42 TABLET | Refills: 0 | OUTPATIENT
Start: 2024-01-10

## 2024-01-10 RX ORDER — EPINEPHRINE 0.1 MG/ML
SYRINGE (ML) INJECTION
Status: DISCONTINUED | OUTPATIENT
Start: 2024-01-10 | End: 2024-01-10 | Stop reason: HOSPADM

## 2024-01-10 RX ORDER — MIDAZOLAM HYDROCHLORIDE 1 MG/ML
INJECTION INTRAMUSCULAR; INTRAVENOUS PRN
Status: DISCONTINUED | OUTPATIENT
Start: 2024-01-10 | End: 2024-01-10 | Stop reason: SURG

## 2024-01-10 RX ORDER — KETOROLAC TROMETHAMINE 30 MG/ML
INJECTION, SOLUTION INTRAMUSCULAR; INTRAVENOUS
Status: DISCONTINUED | OUTPATIENT
Start: 2024-01-10 | End: 2024-01-10 | Stop reason: HOSPADM

## 2024-01-10 RX ORDER — TRANEXAMIC ACID 100 MG/ML
INJECTION, SOLUTION INTRAVENOUS PRN
Status: DISCONTINUED | OUTPATIENT
Start: 2024-01-10 | End: 2024-01-10 | Stop reason: SURG

## 2024-01-10 RX ORDER — OXYCODONE HCL 5 MG/5 ML
10 SOLUTION, ORAL ORAL
Status: COMPLETED | OUTPATIENT
Start: 2024-01-10 | End: 2024-01-10

## 2024-01-10 RX ORDER — OXYCODONE HCL 10 MG/1
10 TABLET, FILM COATED, EXTENDED RELEASE ORAL ONCE
Status: COMPLETED | OUTPATIENT
Start: 2024-01-10 | End: 2024-01-10

## 2024-01-10 RX ORDER — ROPIVACAINE HYDROCHLORIDE 5 MG/ML
INJECTION, SOLUTION EPIDURAL; INFILTRATION; PERINEURAL
Status: DISCONTINUED | OUTPATIENT
Start: 2024-01-10 | End: 2024-01-10 | Stop reason: HOSPADM

## 2024-01-10 RX ORDER — LABETALOL HYDROCHLORIDE 5 MG/ML
5 INJECTION, SOLUTION INTRAVENOUS
Status: DISCONTINUED | OUTPATIENT
Start: 2024-01-10 | End: 2024-01-10 | Stop reason: HOSPADM

## 2024-01-10 RX ORDER — ROPIVACAINE HYDROCHLORIDE 5 MG/ML
INJECTION, SOLUTION EPIDURAL; INFILTRATION; PERINEURAL
Status: COMPLETED | OUTPATIENT
Start: 2024-01-10 | End: 2024-01-10

## 2024-01-10 RX ORDER — HYDRALAZINE HYDROCHLORIDE 20 MG/ML
5 INJECTION INTRAMUSCULAR; INTRAVENOUS
Status: DISCONTINUED | OUTPATIENT
Start: 2024-01-10 | End: 2024-01-10 | Stop reason: HOSPADM

## 2024-01-10 RX ORDER — ONDANSETRON 2 MG/ML
INJECTION INTRAMUSCULAR; INTRAVENOUS PRN
Status: DISCONTINUED | OUTPATIENT
Start: 2024-01-10 | End: 2024-01-10 | Stop reason: SURG

## 2024-01-10 RX ORDER — EPHEDRINE SULFATE 50 MG/ML
INJECTION, SOLUTION INTRAVENOUS PRN
Status: DISCONTINUED | OUTPATIENT
Start: 2024-01-10 | End: 2024-01-10 | Stop reason: SURG

## 2024-01-10 RX ORDER — HYDROMORPHONE HYDROCHLORIDE 1 MG/ML
0.2 INJECTION, SOLUTION INTRAMUSCULAR; INTRAVENOUS; SUBCUTANEOUS
Status: DISCONTINUED | OUTPATIENT
Start: 2024-01-10 | End: 2024-01-10 | Stop reason: HOSPADM

## 2024-01-10 RX ORDER — MEPERIDINE HYDROCHLORIDE 25 MG/ML
12.5 INJECTION INTRAMUSCULAR; INTRAVENOUS; SUBCUTANEOUS
Status: DISCONTINUED | OUTPATIENT
Start: 2024-01-10 | End: 2024-01-10 | Stop reason: HOSPADM

## 2024-01-10 RX ORDER — HYDROMORPHONE HYDROCHLORIDE 1 MG/ML
0.4 INJECTION, SOLUTION INTRAMUSCULAR; INTRAVENOUS; SUBCUTANEOUS
Status: DISCONTINUED | OUTPATIENT
Start: 2024-01-10 | End: 2024-01-10 | Stop reason: HOSPADM

## 2024-01-10 RX ADMIN — FENTANYL CITRATE 50 MCG: 50 INJECTION, SOLUTION INTRAMUSCULAR; INTRAVENOUS at 07:39

## 2024-01-10 RX ADMIN — EPHEDRINE SULFATE 5 MG: 50 INJECTION, SOLUTION INTRAVENOUS at 09:19

## 2024-01-10 RX ADMIN — OXYCODONE HYDROCHLORIDE 10 MG: 10 TABLET, FILM COATED, EXTENDED RELEASE ORAL at 06:58

## 2024-01-10 RX ADMIN — OXYCODONE HYDROCHLORIDE 10 MG: 5 SOLUTION ORAL at 10:13

## 2024-01-10 RX ADMIN — LIDOCAINE HYDROCHLORIDE 50 MG: 20 INJECTION, SOLUTION EPIDURAL; INFILTRATION; INTRACAUDAL at 07:39

## 2024-01-10 RX ADMIN — FENTANYL CITRATE 50 MCG: 50 INJECTION, SOLUTION INTRAMUSCULAR; INTRAVENOUS at 10:13

## 2024-01-10 RX ADMIN — CELECOXIB 400 MG: 200 CAPSULE ORAL at 06:58

## 2024-01-10 RX ADMIN — HYDROMORPHONE HYDROCHLORIDE 1 MG: 2 INJECTION INTRAMUSCULAR; INTRAVENOUS; SUBCUTANEOUS at 07:44

## 2024-01-10 RX ADMIN — KETOROLAC TROMETHAMINE 15 MG: 30 INJECTION, SOLUTION INTRAMUSCULAR; INTRAVENOUS at 09:40

## 2024-01-10 RX ADMIN — ALBUTEROL SULFATE 2.5 MG: 2.5 SOLUTION RESPIRATORY (INHALATION) at 11:31

## 2024-01-10 RX ADMIN — SODIUM CHLORIDE, POTASSIUM CHLORIDE, SODIUM LACTATE AND CALCIUM CHLORIDE: 600; 310; 30; 20 INJECTION, SOLUTION INTRAVENOUS at 09:22

## 2024-01-10 RX ADMIN — EPHEDRINE SULFATE 10 MG: 50 INJECTION, SOLUTION INTRAVENOUS at 07:48

## 2024-01-10 RX ADMIN — HYDROMORPHONE HYDROCHLORIDE 0.5 MG: 2 INJECTION INTRAMUSCULAR; INTRAVENOUS; SUBCUTANEOUS at 08:44

## 2024-01-10 RX ADMIN — FENTANYL CITRATE 50 MCG: 50 INJECTION, SOLUTION INTRAMUSCULAR; INTRAVENOUS at 09:28

## 2024-01-10 RX ADMIN — MIDAZOLAM HYDROCHLORIDE 2 MG: 1 INJECTION, SOLUTION INTRAMUSCULAR; INTRAVENOUS at 07:35

## 2024-01-10 RX ADMIN — TRANEXAMIC ACID 1000 MG: 100 INJECTION, SOLUTION INTRAVENOUS at 07:44

## 2024-01-10 RX ADMIN — DEXAMETHASONE SODIUM PHOSPHATE 8 MG: 4 INJECTION INTRA-ARTICULAR; INTRALESIONAL; INTRAMUSCULAR; INTRAVENOUS; SOFT TISSUE at 07:44

## 2024-01-10 RX ADMIN — ROPIVACAINE HYDROCHLORIDE 20 ML: 5 INJECTION EPIDURAL; INFILTRATION; PERINEURAL at 07:20

## 2024-01-10 RX ADMIN — PROPOFOL 150 MG: 10 INJECTION, EMULSION INTRAVENOUS at 07:39

## 2024-01-10 RX ADMIN — TRANEXAMIC ACID 1000 MG: 100 INJECTION, SOLUTION INTRAVENOUS at 09:11

## 2024-01-10 RX ADMIN — ACETAMINOPHEN 1000 MG: 500 TABLET ORAL at 06:58

## 2024-01-10 RX ADMIN — ONDANSETRON 4 MG: 2 INJECTION INTRAMUSCULAR; INTRAVENOUS at 09:40

## 2024-01-10 RX ADMIN — HYDROMORPHONE HYDROCHLORIDE 0.5 MG: 2 INJECTION INTRAMUSCULAR; INTRAVENOUS; SUBCUTANEOUS at 08:15

## 2024-01-10 RX ADMIN — SODIUM CHLORIDE, POTASSIUM CHLORIDE, SODIUM LACTATE AND CALCIUM CHLORIDE: 600; 310; 30; 20 INJECTION, SOLUTION INTRAVENOUS at 06:58

## 2024-01-10 RX ADMIN — EPHEDRINE SULFATE 5 MG: 50 INJECTION, SOLUTION INTRAVENOUS at 09:26

## 2024-01-10 RX ADMIN — EPHEDRINE SULFATE 5 MG: 50 INJECTION, SOLUTION INTRAVENOUS at 09:14

## 2024-01-10 RX ADMIN — CEFAZOLIN 2 G: 1 INJECTION, POWDER, FOR SOLUTION INTRAMUSCULAR; INTRAVENOUS at 07:44

## 2024-01-10 ASSESSMENT — PAIN DESCRIPTION - PAIN TYPE
TYPE: SURGICAL PAIN
TYPE: ACUTE PAIN
TYPE: SURGICAL PAIN

## 2024-01-10 ASSESSMENT — GAIT ASSESSMENTS
DEVIATION: STEP TO
ASSISTIVE DEVICE: FRONT WHEEL WALKER
GAIT LEVEL OF ASSIST: CONTACT GUARD ASSIST
DISTANCE (FEET): 30

## 2024-01-10 ASSESSMENT — FIBROSIS 4 INDEX
FIB4 SCORE: 0.83
FIB4 SCORE: 0.83

## 2024-01-10 ASSESSMENT — PATIENT HEALTH QUESTIONNAIRE - PHQ9
2. FEELING DOWN, DEPRESSED, IRRITABLE, OR HOPELESS: NOT AT ALL
1. LITTLE INTEREST OR PLEASURE IN DOING THINGS: NOT AT ALL
SUM OF ALL RESPONSES TO PHQ9 QUESTIONS 1 AND 2: 0

## 2024-01-10 ASSESSMENT — COGNITIVE AND FUNCTIONAL STATUS - GENERAL
SUGGESTED CMS G CODE MODIFIER MOBILITY: CJ
WALKING IN HOSPITAL ROOM: A LITTLE
MOBILITY SCORE: 20
CLIMB 3 TO 5 STEPS WITH RAILING: A LITTLE
STANDING UP FROM CHAIR USING ARMS: A LITTLE
MOVING FROM LYING ON BACK TO SITTING ON SIDE OF FLAT BED: A LITTLE

## 2024-01-10 ASSESSMENT — PAIN SCALES - GENERAL: PAIN_LEVEL: 5

## 2024-01-10 NOTE — ANESTHESIA PROCEDURE NOTES
Peripheral Block    Date/Time: 1/10/2024 7:20 AM    Performed by: Jeremias Nguyen M.D.  Authorized by: Jeremias Nguyen M.D.    Patient Location:  Pre-op  Start Time:  1/10/2024 7:20 AM  Reason for Block: at surgeon's request and post-op pain management ONLY    patient identified, IV checked, site marked, risks and benefits discussed, surgical consent, monitors and equipment checked, pre-op evaluation and timeout performed    Patient Position:  Supine  Prep: ChloraPrep    Monitoring:  Heart rate, continuous pulse ox and cardiac monitor  Block Region:  Lower Extremity  Lower Extremity - Block Type:  Selective FEMORAL nerve block at the Adductor Canal    Laterality:  Left  Procedures: ultrasound guided  Image captured, interpreted and electronically stored.  Local Infiltration:  Lidocaine  Strength:  1 %  Dose:  3 ml  Block Type:  Single-shot  Needle Length:  100mm  Needle Gauge:  21 G  Needle Localization:  Ultrasound guidance  Ultrasound picture in chart  Injection Assessment:  Negative aspiration for heme, no paresthesia on injection, incremental injection and local visualized surrounding nerve on ultrasound  Evidence of intravascular injection: No     US Guided Selective Femoral Nerve Block at Adductor Canal:   US probe placed at mid-thigh level on externally rotated leg and femur identified.  Probe directed medially until Sartorius Muscle (SM), Femoral Artery (FA) and Saphenous Nerve (SN) identified in Adductor Canal (AC).  Needle inserted anterolateral to probe in an in plane approach into a subsartorial perivascular perineural position.  After negative aspiration LA injected with ease and visualized spreading within the AC.  US image in chart

## 2024-01-10 NOTE — ANESTHESIA TIME REPORT
Anesthesia Start and Stop Event Times       Date Time Event    1/10/2024 0720 Ready for Procedure     0735 Anesthesia Start     0951 Anesthesia Stop          Responsible Staff  01/10/24      Name Role Begin End    Jeremias Nguyen M.D. Anesth 0735 [id number]          Overtime Reason:  no overtime (within assigned shift)    Comments:

## 2024-01-10 NOTE — OP REPORT
Operative Report    PreOp Diagnosis: Left tricompartmental knee osteoarthritis      PostOp Diagnosis: Same      Procedure(s):  LEFT TOTAL KNEE ARTHROPLASTY with Rainer robot- Wound Class: Clean    Surgeon(s):  Wero Perez M.D.    Anesthesiologist/Type of Anesthesia:  Anesthesiologist: Jeremias Nguyen M.D./General    Surgical Staff:  Circulator: Jarred Gavin R.N.  Relief Circulator: Quiana Alejandro R.N.  Scrub Person: Mae Pearson; Boby Linnea    First Assist: YISSEL Alvarez A surgical assistant was required for this procedure for multiple reasons.  This is a complicated procedure that requires surgically trained assistant to assist in patient positioning, tissue handling and exposure, as well as, assistance with surgical wound closure.  There were multiple maneuvers being performed simultaneously that enemy able to accomplish without an assistant.  Performing this procedure alone would increase the complexity, the operating time, the anesthesia time and potentially increased perioperative complication rates.      Specimens removed if any:  * No specimens in log *    Estimated Blood Loss: 100 cc    Findings: Tricompartmental knee osteoarthritis    Complications: None    Indications for surgery:  72 yo M with continued left knee pain. Failed conservative treatment.  He had undergone total knee on the right and wants to proceed with left total knee arthroplasty on this side.  Risk benefits alternatives were discussed and he wished to proceed.    Procedure in detail:  Patient was met in the preoperative holding area where the left knee was marked.  Risk-benefit alternatives again discussed and consent was signed.  Take back to the operative suite induced under general anesthesia placed supine regular bed.  Left knee was then prepped and draped in usual sterile fashion timeout performed verifying surgical site surgical procedure perioperative antibiotics implants and staff.  Began by making our anterior  incision medial parapatellar arthrotomy performed our medial release to the 50 yard line resected out the fat pad.  We everted the patella did our patellar cut and protected throughout the case.  At this point we then pinned into place the pins for the femur and the tibia for the Rainer registration.  We put in the markers on the femur and tibia as well.  At this point we then finished our soft tissue releases of ACL PCL and the menisci.  We then calibrated the Rainer with hip center rotation medial lateral malleolus and the PinPoint's on the femur and tibia.  Once it was calibrated we brought the machine in we made our tibial cut are anterior posterior and distal cut on the femur and then switch the blade and recalibrated for Pollock cuts.  Once the cuts were made we placed our trial implants.  To get through range of motion and stability testing.  It was consistent with a low-grade plan on the Rainer.  We then removed all of the implants got rid of any additional osteophytes.  We then pinned prepped our tibia femur and patella.  We irrigated these out dried out the cuts mixed our cement on the back table placed the tibial component malleted and removed all excess cement send for the femur and the patella we placed the knee in extension while the cement was drying with her spacer and there after the cement during the cement dried we did a Betadine soak.  We irrigated out all the Betadine.  We then removed excess cement trialed again I had a knee that was stable and had excellent range of motion.  We placed our final poly in.  We again had full range of motion was stable.  At this point we irrigated again with copious amounts normal saline closed with #1 Vicryl and a will run her over the top for the arthrotomy 2-0 Monocryl buried interrupted fashion for the deep dermal layer and staples for skin.  Sterile dressings were applied the patient was woken from general anesthesia taken the PACU without  complications.    Implants: Hobgood triathlon cemented size 5 femur, 6 tibia, 11 mm CS poly, 33 mm patella

## 2024-01-10 NOTE — ANESTHESIA POSTPROCEDURE EVALUATION
Patient: Den Meeks    Procedure Summary       Date: 01/10/24 Room / Location:  OR 06 / SURGERY [facility]    Anesthesia Start: 0735 Anesthesia Stop: 0951    Procedure: LEFT TOTAL KNEE ARTHROPLASTY (Left: Knee) Diagnosis: (OSTEOARTHRITIS OF LEFT KNEE JOINT)    Surgeons: Wero Perez M.D. Responsible Provider: Jeremias Nguyen M.D.    Anesthesia Type: general, peripheral nerve block ASA Status: 2            Final Anesthesia Type: general, peripheral nerve block  Last vitals  BP   Blood Pressure : 116/78    Temp   36 °C (96.8 °F)    Pulse   89   Resp   15    SpO2   89 %      Anesthesia Post Evaluation    Patient location during evaluation: PACU  Patient participation: complete - patient participated  Level of consciousness: awake and alert  Pain score: 5    Airway patency: patent  Anesthetic complications: no  Cardiovascular status: hemodynamically stable  Respiratory status: acceptable and nasal cannula  Hydration status: euvolemic  Comments: Unable to wean off O2, patient said happened after last total knee. Will admit overnight for observation/extended stay. Adequate IS, chest x ray ordered, breathing treatment done. Needing 2L NC to maintain sats above 88%    PONV: none          There were no known notable events for this encounter.     Nurse Pain Score: 5 (NPRS)

## 2024-01-10 NOTE — ANESTHESIA PREPROCEDURE EVALUATION
Case: [id number] Date/Time: 01/10/24 0715    Procedure: LEFT TOTAL KNEE ARTHROPLASTY    Pre-op diagnosis: OSTEOARTHRITIS OF LEFT KNEE JOINT    Location: [facility] OR 06 / SURGERY [facility]    Surgeons: Wero Perez M.D.          Normal ECHO and negative stress test 5/2023    Relevant Problems   GI   (positive) Gastroesophageal reflux disease without esophagitis       Physical Exam    Airway   Mallampati: II  TM distance: >3 FB  Neck ROM: full       Cardiovascular - normal exam  Rhythm: regular  Rate: normal  (-) murmur     Dental - normal exam           Pulmonary - normal exam  Breath sounds clear to auscultation     Abdominal    Neurological - normal exam                   Anesthesia Plan    ASA 2       Plan - general and peripheral nerve block     Peripheral nerve block will be post-op pain control  Airway plan will be LMA          Induction: intravenous    Postoperative Plan: Postoperative administration of opioids is intended.    Pertinent diagnostic labs and testing reviewed    Informed Consent:    Anesthetic plan and risks discussed with patient.    Use of blood products discussed with: patient whom consented to blood products.

## 2024-01-10 NOTE — OR NURSING
0554: Brought patient back to pre-op and assumed care. Pt needed WC to bring back, because his knee was hurting him more, they got lost coming into the building.  0702: Patient allergies and NPO status verified, home medication reconciliation completed and belongings secured. Patient verbalizes understanding of pain scale, expected course of stay and plan of care. Surgical site verified with patient. IV access established. Sequentials placed on legs.

## 2024-01-10 NOTE — OR SURGEON
Immediate Post OP Note    PreOp Diagnosis: Left tricompartmental knee osteoarthritis      PostOp Diagnosis: Same      Procedure(s):  LEFT TOTAL KNEE ARTHROPLASTY with Rainer robot- Wound Class: Clean    Surgeon(s):  Wero Perez M.D.    Anesthesiologist/Type of Anesthesia:  Anesthesiologist: Jeremias Nguyen M.D./General    Surgical Staff:  Circulator: Jarred Gavin R.N.  Relief Circulator: Quiana Alejandro R.N.  Scrub Person: Mae Pearson; Boby Yarrbough    First Assist: YISSEL Alvarez A surgical assistant was required for this procedure for multiple reasons.  This is a complicated procedure that requires surgically trained assistant to assist in patient positioning, tissue handling and exposure, as well as, assistance with surgical wound closure.  There were multiple maneuvers being performed simultaneously that enemy able to accomplish without an assistant.  Performing this procedure alone would increase the complexity, the operating time, the anesthesia time and potentially increased perioperative complication rates.      Specimens removed if any:  * No specimens in log *    Estimated Blood Loss: 100 cc    Findings: Tricompartmental knee osteoarthritis    Complications: None        1/10/2024 9:47 AM Wero Perez M.D.

## 2024-01-10 NOTE — ANESTHESIA PROCEDURE NOTES
Peripheral IV    Date/Time: 1/10/2024 7:45 AM    Performed by: Jeremias Nguyen M.D.  Authorized by: Jeremias Nguyen M.D.    Size:  20 G  Laterality:  Right  Peripheral IV Location:  Forearm  Site Prep:  Alcohol  Technique:  Direct puncture  Attempts:  1

## 2024-01-10 NOTE — OR NURSING
1217: Rcvd report from JOSHUA Irwin and [facility] care. Pt resting comfortably in the gurney, pain is tolerable, no nausea, tolerating sips of juice, but O2 saturations remain low, on room air his saturations díaz at 84 and drops to 74%. Placed him back on 3 LPM, he is able to pull over 3000 ml on his IS and is awake and alert.  1228: O2 saturations at rest on 3 LPM re 94%, will turn his down to 2 LPM and monitor for tolerance. Pt will fall asleep and O2 sats will fall to mid 70s. He is having apneas.  1230: Report given back to JOSHUA Irwin

## 2024-01-10 NOTE — OR NURSING
0949: Patient arrived from OR via gurney.  L knee dressing CDI; pedal pulse +2, cap refill <2 secs. Cold pack applied to knee.     Sedation/Resp Status: Non responsive to verbal with OPA in place.  Respirations spontaneous and non-labored.    1000: Pt sleeping, respirations spontaneous and non-labored via OPA. No change in surgical site assessment.    1010: OPA removed. Pt waking in a lot of pain, Medicated.     1020: Xray at bedside. Pt states pain is 7/10, very sleepy. Sips of water tolerated well.     1030:Instructed on IS use, demonstrated good efforts to 3000 for 4 breaths. Goal approx. 3150.  Wife updated of pt status.     1045: Pt awake working on IS, attempting to wean pt off O2.     1100: Unable to wean pt off O2 at this time. Pt currently on 2L with O2 sat in low 90's, when pt falls asleep, O2 drops as low as 45-50%. Diminished breath sounds bilaterally.     1115: Breathing tx in process. Pt states he has tolerable pain, denies nausea. Talked to pt's wife, [facility] and [facility] are out of oxycodone.   1145: Pt working in IS =3000. Sleeps off and on.    1245: Pharmacy brought rx to bedside .    1300: Wife came to bedside to take pt's rx home. Pt doesn't meet criteria for discharge at this time due to oxygenation, Dr Nguyen updated. CXR ordered.     1330: Xray at bedside. Dr. Nguyen notified.     1345: Pt sleeping comfortably. L knee dressing CDI.     1515: Pt will be admitted to [facility].     1433: Report to JOSHUA Moya. Pt's wife updated of pt status and with rm#.

## 2024-01-10 NOTE — ANESTHESIA PROCEDURE NOTES
Airway    Date/Time: 1/10/2024 7:40 AM    Performed by: Jeremias Nguyen M.D.  Authorized by: Jeremias Nguyen M.D.    Location:  OR  Urgency:  Elective  Indications for Airway Management:  Anesthesia      Spontaneous Ventilation: absent    Sedation Level:  Deep  Preoxygenated: Yes    Final Airway Type:  Supraglottic airway  Final Supraglottic Airway:  Standard LMA    SGA Size:  5  Number of Attempts at Approach:  1

## 2024-01-10 NOTE — DISCHARGE INSTRUCTIONS
If any questions arise, call your provider.  If your provider is not available, please feel free to call the Surgical Center at [phone].    MEDICATIONS: Resume taking daily medication.  Take prescribed pain medication with food.  If no medication is prescribed, you may take non-aspirin pain medication if needed.  PAIN MEDICATION CAN BE VERY CONSTIPATING.  Take a stool softener or laxative such as senokot, pericolace, or milk of magnesia if needed.    Last pain medication given at 10:15 am, oxycodone 10 mg.     What to Expect Post Anesthesia    Rest and take it easy for the first 24 hours.  A responsible adult is recommended to remain with you during that time.  It is normal to feel sleepy.  We encourage you to not do anything that requires balance, judgment or coordination.    FOR 24 HOURS DO NOT:  Drive, operate machinery or run household appliances.  Drink beer or alcoholic beverages.  Make important decisions or sign legal documents.    To avoid nausea, slowly advance diet as tolerated, avoiding spicy or greasy foods for the first day.  Add more substantial food to your diet according to your provider's instructions.  Babies can be fed formula or breast milk as soon as they are hungry.  INCREASE FLUIDS AND FIBER TO AVOID CONSTIPATION.    MILD FLU-LIKE SYMPTOMS ARE NORMAL.  YOU MAY EXPERIENCE GENERALIZED MUSCLE ACHES, THROAT IRRITATION, HEADACHE AND/OR SOME NAUSEA.      PLEASE SEE DR. LERMA WHITE SHEET FOR  HOME INSTRUCTIONS       Peripheral Nerve Block Discharge Instructions from Same Day Surgery and Inpatient :    What to Expect - Lower Extremity  The block may cause you to experience numbness and weakness in your thigh and knee on the same side as your surgery  Numbness, tingling and / or weakness are all normal. For some people, this may be an unpleasant sensation  These issues will be resolved when the local anesthetic wears off   You may experience numbness and tingling in your thigh on the same  "side as your surgery if the block medicine was injected at your groin area  Numbness will make it difficult to walk  You may have problems with balance and walking so be very careful   Follow your surgeon's direction regarding weight bearing on your surgical limb  Be very careful with your numb limb  Precautions  The numbness may affect your balance  Be careful when walking or moving around  Your leg may be weak: be very careful putting weight on it  If your surgeon did not specify a time, you should not bear weight for 24 hours  Be sure to ask for help when you need it  It is better to have help than to fall and hurt yourself  Prevent Injury  Protect the limb like a baby  Beware of exposing your limb to extreme heat or cold or trauma  The limb may be injured without you noticing because it is numb  Keep the limb elevated whenever possible  Do not sleep on the limb  Change the position of the limb regularly  Avoid putting pressure on your surgical limb  Pain Control  The initial block on the day of surgery will make your extremity feel \"numb\"  Any consecutive injection including prior to discharge from the hospital will make your extremity feel \"numb\"  You may feel an aching or burning when the local anesthesia starts to wear off  Take pain pills as prescribed by your surgeon  Call your surgeon or anesthesiologist if you do not have adequate pain control    "

## 2024-01-11 ENCOUNTER — PATIENT OUTREACH (OUTPATIENT)
Dept: SCHEDULING | Facility: IMAGING CENTER | Age: 74
End: 2024-01-11
Payer: MEDICARE

## 2024-01-11 VITALS
TEMPERATURE: 98.3 F | HEIGHT: 71 IN | SYSTOLIC BLOOD PRESSURE: 129 MMHG | BODY MASS INDEX: 28.42 KG/M2 | DIASTOLIC BLOOD PRESSURE: 71 MMHG | OXYGEN SATURATION: 94 % | RESPIRATION RATE: 16 BRPM | WEIGHT: 203 LBS | HEART RATE: 77 BPM

## 2024-01-11 PROCEDURE — 94760 N-INVAS EAR/PLS OXIMETRY 1: CPT

## 2024-01-11 PROCEDURE — 97165 OT EVAL LOW COMPLEX 30 MIN: CPT

## 2024-01-11 PROCEDURE — A9270 NON-COVERED ITEM OR SERVICE: HCPCS | Performed by: STUDENT IN AN ORGANIZED HEALTH CARE EDUCATION/TRAINING PROGRAM

## 2024-01-11 PROCEDURE — 97110 THERAPEUTIC EXERCISES: CPT

## 2024-01-11 PROCEDURE — 770030 HCHG ROOM/CARE - EXTENDED RECOVERY EACH 15 MIN

## 2024-01-11 PROCEDURE — 700102 HCHG RX REV CODE 250 W/ 637 OVERRIDE(OP): Performed by: STUDENT IN AN ORGANIZED HEALTH CARE EDUCATION/TRAINING PROGRAM

## 2024-01-11 PROCEDURE — 97535 SELF CARE MNGMENT TRAINING: CPT

## 2024-01-11 PROCEDURE — 97116 GAIT TRAINING THERAPY: CPT

## 2024-01-11 RX ORDER — OXYCODONE HYDROCHLORIDE 10 MG/1
10 TABLET ORAL ONCE
Status: COMPLETED | OUTPATIENT
Start: 2024-01-11 | End: 2024-01-11

## 2024-01-11 RX ADMIN — OXYCODONE HYDROCHLORIDE 10 MG: 10 TABLET ORAL at 13:49

## 2024-01-11 ASSESSMENT — COGNITIVE AND FUNCTIONAL STATUS - GENERAL
STANDING UP FROM CHAIR USING ARMS: A LITTLE
DRESSING REGULAR LOWER BODY CLOTHING: A LITTLE
MOBILITY SCORE: 21
MOVING TO AND FROM BED TO CHAIR: A LITTLE
SUGGESTED CMS G CODE MODIFIER DAILY ACTIVITY: CJ
MOBILITY SCORE: 22
SUGGESTED CMS G CODE MODIFIER MOBILITY: CJ
HELP NEEDED FOR BATHING: A LITTLE
SUGGESTED CMS G CODE MODIFIER DAILY ACTIVITY: CJ
CLIMB 3 TO 5 STEPS WITH RAILING: A LITTLE
DAILY ACTIVITIY SCORE: 21
DRESSING REGULAR UPPER BODY CLOTHING: A LITTLE
SUGGESTED CMS G CODE MODIFIER MOBILITY: CJ
WALKING IN HOSPITAL ROOM: A LITTLE
TOILETING: A LITTLE
DRESSING REGULAR LOWER BODY CLOTHING: A LITTLE
DAILY ACTIVITIY SCORE: 21
CLIMB 3 TO 5 STEPS WITH RAILING: A LITTLE
HELP NEEDED FOR BATHING: A LITTLE

## 2024-01-11 ASSESSMENT — GAIT ASSESSMENTS
DISTANCE (FEET): 15
ASSISTIVE DEVICE: FRONT WHEEL WALKER
GAIT LEVEL OF ASSIST: STANDBY ASSIST
DISTANCE (FEET): 120
DEVIATION: STEP TO;ANTALGIC

## 2024-01-11 ASSESSMENT — PAIN DESCRIPTION - PAIN TYPE: TYPE: SURGICAL PAIN

## 2024-01-11 ASSESSMENT — ACTIVITIES OF DAILY LIVING (ADL): TOILETING: INDEPENDENT

## 2024-01-11 ASSESSMENT — LIFESTYLE VARIABLES
HOW MANY TIMES IN THE PAST YEAR HAVE YOU HAD 5 OR MORE DRINKS IN A DAY: 0
AVERAGE NUMBER OF DAYS PER WEEK YOU HAVE A DRINK CONTAINING ALCOHOL: 2
TOTAL SCORE: 0
HAVE PEOPLE ANNOYED YOU BY CRITICIZING YOUR DRINKING: NO
ALCOHOL_USE: YES
ON A TYPICAL DAY WHEN YOU DRINK ALCOHOL HOW MANY DRINKS DO YOU HAVE: 1
TOTAL SCORE: 0
EVER HAD A DRINK FIRST THING IN THE MORNING TO STEADY YOUR NERVES TO GET RID OF A HANGOVER: NO
HAVE YOU EVER FELT YOU SHOULD CUT DOWN ON YOUR DRINKING: NO
EVER FELT BAD OR GUILTY ABOUT YOUR DRINKING: NO
CONSUMPTION TOTAL: NEGATIVE
TOTAL SCORE: 0

## 2024-01-11 ASSESSMENT — FIBROSIS 4 INDEX: FIB4 SCORE: 0.83

## 2024-01-11 NOTE — CARE PLAN
The patient is Stable - Low risk of patient condition declining or worsening    Shift Goals  Clinical Goals: wean off O2, monitor pain <4/10  Patient Goals: rest and go home    Progress made toward(s) clinical / shift goals:  patient still on 2L but saturating in the upper 90s, pain controlled so far from meds in PACU    Patient is not progressing towards the following goals: n/a

## 2024-01-11 NOTE — THERAPY
Occupational Therapy   Initial Evaluation     Patient Name: Den Meeks  Age:  73 y.o., Sex:  male  Medical Record #: [id number]  Today's Date: 1/11/2024     Precautions  Precautions: (P) Weight Bearing As Tolerated Right Lower Extremity    Assessment  Patient is 73 y.o. male with a diagnosis of Right TKA.  Additional factors influencing patient status / progress: Right knee discomfort.  Pt and wife reside in a 2 story home in [place], [place].  Wife is available to assist as needed.  PLOF Mod I to Indep for ADL's, transfers and ambulation w/ out a device.  Therapist reviewed environmental/safety awareness, joint protection, fall precautions, AE/DME, ADL's and transfers.      Plan    Occupational Therapy Initial Treatment Plan   Duration: (P) Evaluation only    DC Equipment Recommendations: (P) None  Discharge Recommendations: (P) Anticipate that the patient will have no further occupational therapy needs after discharge from the hospital.    Subjective    Pt was alert and cooperative w/ tx.     Objective       01/11/24 0900    Services   Is patient using  services for this encounter? No   Initial Contact Note    Initial Contact Note Order Received and Verified, Evaluation Only - Patient Does Not Require Further Acute Occupational Therapy at this Time.  However, May Benefit from Post Acute Therapy for Higher Level Functional Deficits.   Prior Living Situation   Prior Services Home-Independent   Housing / Facility 2 Story House   Steps Into Home 1   Steps In Home 14   Rail Both Rail (Steps in Home)   Elevator No   Bathroom Set up Walk In Shower;Built-In Shower Chair   Equipment Owned Single Point Cane;Front-Wheel Walker   Lives with - Patient's Self Care Capacity Spouse   Comments Pt and wife reside in a 2 story home in [place], [place].  Wife is available to assist as needed.  PLOF Mod I to Indep for ADL's, transfers and ambulation w/ out a device.   Prior Level of ADL Function   Self  Feeding Independent   Grooming / Hygiene Independent   Bathing Independent   Dressing Independent   Toileting Independent   Prior Level of IADL Function   Medication Management Independent   Laundry Independent   Kitchen Mobility Independent   Finances Independent   Home Management Independent   Shopping Independent   Prior Level Of Mobility Independent Without Device in Community;Independent With Steps in Community;Independent Without Device in Home;Independent With Steps in Home   Driving / Transportation Driving Independent   Occupation (Pre-Hospital Vocational) Retired Due To Age   History of Falls   History of Falls No   Precautions   Precautions Weight Bearing As Tolerated Right Lower Extremity   Vitals   Pulse 77   Blood Pressure  129/71   Respiration 16   Pulse Oximetry 94 %   O2 (LPM) 0   O2 Delivery Device None - Room Air   Pain   Intervention Cold Pack;Rest;Repositioned   Pain 0 - 10 Group   Location Knee   Location Orientation Left   Description Sore   Comfort Goal Perform Activity;Comfort with Movement   Therapist Pain Assessment Prior to Activity;During Activity;Post Activity;3   Non Verbal Descriptors   Non Verbal Scale  Calm;Unlabored Breathing   Cognition    Cognition / Consciousness X   Speech/ Communication Hard of Hearing   Level of Consciousness Alert   Coordination Upper Body   Coordination WDL   Balance Assessment   Sitting Balance (Static) Good   Sitting Balance (Dynamic) Good   Standing Balance (Static) Fair +   Standing Balance (Dynamic) Fair   Weight Shift Sitting Good   Weight Shift Standing Fair   Comments OOB FWW   Bed Mobility    Supine to Sit Modified Independent   Sit to Supine Modified Independent   ADL Assessment   Grooming Standby Assist;Standing   Upper Body Dressing Independent   Lower Body Dressing Minimal Assist   Toileting Standby Assist   How much help from another person does the patient currently need...   Putting on and taking off regular lower body clothing? 3   Bathing  (including washing, rinsing, and drying)? 3   Toileting, which includes using a toilet, bedpan, or urinal? 3   Putting on and taking off regular upper body clothing? 4   Taking care of personal grooming such as brushing teeth? 4   Eating meals? 4   6 Clicks Daily Activity Score 21   Functional Mobility   Sit to Stand Standby Assist   Bed, Chair, Wheelchair Transfer Standby Assist   Toilet Transfers Contact Guard Assist   Transfer Method Stand Step   Mobility SBA FWW   Distance (Feet) 15   # of Times Distance was Traveled 2   Edema / Skin Assessment   Edema / Skin  WDL   Comments Surgical site/dressing clean, dry and intact.   Education Group   Education Provided Joint Protection;Transfers;Role of Occupational Therapist;Activities of Daily Living;Adaptive Equipment   Role of Occupational Therapist Patient Response Patient;Acceptance;Explanation;Verbal Demonstration   Joint Protection Patient Response Patient;Acceptance;Explanation;Demonstration;Teach Back;Verbal Demonstration;Action Demonstration   Transfers Patient Response Patient;Acceptance;Explanation;Demonstration;Teach Back;Verbal Demonstration;Action Demonstration   ADL Patient Response Patient;Acceptance;Explanation;Demonstration;Teach Back;Verbal Demonstration;Action Demonstration   Adaptive Equipment Patient Response Patient;Acceptance;Explanation;Demonstration;Teach Back;Verbal Demonstration;Action Demonstration   Occupational Therapy Initial Treatment Plan    Duration Evaluation only   Anticipated Discharge Equipment and Recommendations   DC Equipment Recommendations None   Discharge Recommendations Anticipate that the patient will have no further occupational therapy needs after discharge from the hospital

## 2024-01-11 NOTE — CARE PLAN
The patient is Stable - Low risk of patient condition declining or worsening    Shift Goals  Clinical Goals: wean off O2, work with PT/OT  Patient Goals: d/c home    Progress made toward(s) clinical / shift goals:  on RA, PT eval completed    Patient is not progressing towards the following goals: n/a

## 2024-01-11 NOTE — THERAPY
Physical Therapy   Initial Evaluation     Patient Name: Den Meeks  Age:  73 y.o., Sex:  male  Medical Record #: [id number]  Today's Date: 1/10/2024     Precautions  Precautions: (P) Weight Bearing As Tolerated Left Lower Extremity    Assessment  Patient is 73 y.o. male with a diagnosis of L TKR Pt lives at home with wife and is active.02 sat 92% on 2 litres.Acute PT needed to improve transfers and ambulation     Plan    Physical Therapy Initial Treatment Plan   Treatment Plan : (P) Gait Training, Manual Therapy, Therapeutic Activities, Therapeutic Exercise  Treatment Frequency: (P) Daily    DC Equipment Recommendations: (P) None  Discharge Recommendations: (P) Recommend outpatient physical therapy services to address higher level deficits        Objective       01/10/24 1600   Charge Group   PT Evaluation PT Evaluation Mod   Total Time Spent   PT Evaluation Time Spent (Mins) 30   Initial Contact Note    Initial Contact Note Order Received and Verified, Physical Therapy Evaluation in Progress with Full Report to Follow.   Precautions   Precautions Weight Bearing As Tolerated Left Lower Extremity   Prior Living Situation   Housing / Facility 2 Story House   Steps In Home 10   Equipment Owned Front-Wheel Walker   Lives with - Patient's Self Care Capacity Spouse   Prior Level of Functional Mobility   Bed Mobility Independent   Transfer Status Independent   Ambulation Independent   Assistive Devices Used None   Stairs Independent   History of Falls   History of Falls No   Cognition    Cognition / Consciousness WDL   Comments [place]   Passive ROM Lower Body   Passive ROM Lower Body X   Lt Knee Flexion Degrees 90   Lt Knee Extension Degrees 0   Active ROM Lower Body    Active ROM Lower Body  X   Strength Lower Body   Lower Body Strength  X   Sensation Lower Body   Lower Extremity Sensation   X   Comments block   Coordination Lower Body    Coordination Lower Body  WDL   Balance Assessment   Sitting Balance (Static)  Good   Sitting Balance (Dynamic) Good   Standing Balance (Static) Fair +   Standing Balance (Dynamic) Fair   Weight Shift Sitting Good   Weight Shift Standing Fair   Bed Mobility    Supine to Sit Modified Independent   Sit to Supine Modified Independent   Scooting Modified Independent   Gait Analysis   Gait Level Of Assist Contact Guard Assist   Assistive Device Front Wheel Walker   Distance (Feet) 30   # of Times Distance was Traveled 2   Deviation Step To   # of Stairs Climbed 0   Weight Bearing Status wbat L   Functional Mobility   Sit to Stand Contact Guard Assist   Bed, Chair, Wheelchair Transfer Contact Guard Assist   Toilet Transfers Contact Guard Assist   Transfer Method Stand Step   How much difficulty does the patient currently have...   Turning over in bed (including adjusting bedclothes, sheets and blankets)? 4   Sitting down on and standing up from a chair with arms (e.g., wheelchair, bedside commode, etc.) 3   Moving from lying on back to sitting on the side of the bed? 4   How much help from another person does the patient currently need...   Moving to and from a bed to a chair (including a wheelchair)? 3   Need to walk in a hospital room? 3   Climbing 3-5 steps with a railing? 3   6 clicks Mobility Score 20   Activity Tolerance   Sitting Edge of Bed 10   Standing 6   Patient / Family Goals    Patient / Family Goal #1 Home   Short Term Goals    Short Term Goal # 1 S with transfers in 2 V   Short Term Goal # 2 S with amb x 100 feet in 2 V   Short Term Goal # 3 I with HEP in 2 V   Physical Therapy Initial Treatment Plan    Treatment Plan  Gait Training;Manual Therapy;Therapeutic Activities;Therapeutic Exercise   Treatment Frequency Daily   Problem List    Problems Impaired Transfers;Impaired Ambulation;Impaired Balance;Decreased Activity Tolerance   Anticipated Discharge Equipment and Recommendations   DC Equipment Recommendations None   Discharge Recommendations Recommend outpatient physical therapy  services to address higher level deficits   Interdisciplinary Plan of Care Collaboration   IDT Collaboration with  Nursing   Session Information   Date / Session Number  1/10-1 1/7 1/16   Priority 4

## 2024-01-11 NOTE — PROGRESS NOTES
0700 - Bedside report received from JOSHUA Esquivel. Alert and oriented X4, on 1L NC in no acute respiratory distress. Daily plan of care discussed. Patient complains of no pain at this time. No other needs at this time. Call light and personal belongings within reach. Hourly rounding in place. Chart reviewed for recent labs, notes, and orders.    0911 - called [facility] for discharge orders.    1323 - called [facility] for orders for pain medications. Spoke with Sophie.    
1300 - Patient arrived on floor from PACU, A&Ox4, on RA, reviewed orders, and medication requisition and admission profile done. Oriented Patient to the floor. Plan of care discussed with Patient and answered questions asked. Safety precautions in place and hourly rounding established with CNA.    
4 Eyes Skin Assessment Completed by Conner, RN and Flor RN.    Head WDL  Ears WDL  Nose WDL  Mouth WDL  Neck WDL  Breast/Chest WDL  Shoulder Blades WDL  Spine WDL  (R) Arm/Elbow/Hand WDL  (L) Arm/Elbow/Hand WDL  Abdomen WDL  Groin WDL  Scrotum/Coccyx/Buttocks WDL  (R) Leg WDL  (L) Leg Scar s/p L-TKA  (R) Heel/Foot/Toe WDL  (L) Heel/Foot/Toe WDL          Devices In Places Blood Pressure Cuff and Pulse Ox      Interventions In Place Pressure Redistribution Mattress    Possible Skin Injury No    Pictures Uploaded Into Epic N/A  Wound Consult Placed N/A  RN Wound Prevention Protocol Ordered No     
Per nursing patient is doing well, cleared by PT. Stable for discharge  
Received bedside report from morning RN, assumed care of pt. Pt alert and oriented x4. Assisted pt to BR x1, ambulated with FW, no complaints of pain at this time, on 2lpm via nc with 02 sat 91%. Safety and fall precautions in place, bed locked alarm on, call light within reach.  
Never

## 2024-01-11 NOTE — CARE PLAN
The patient is Stable - Low risk of patient condition declining or worsening    Shift Goals  Clinical Goals: wean off from oxygen, ambulate 50 ft  Patient Goals: d/c home    Progress made toward(s) clinical / shift goals:  Pt ambulated 150 ft and tolerated well, denies pain/discomfort. On RA with 02 sat 93% no s/s of resp distress noted, educate pt to continue using IS.     Patient is not progressing towards the following goals:

## 2024-01-11 NOTE — THERAPY
Physical Therapy   Daily Treatment     Patient Name: Den Meeks  Age:  73 y.o., Sex:  male  Medical Record #: [id number]  Today's Date: 1/11/2024     Precautions  Precautions: (P) Weight Bearing As Tolerated Left Lower Extremity    Assessment    Pt is progressing well with therapy and able to demonstrate with improved activity tolerance, ambulation distance, and overall upright mobility. Pt demonstrates with SBA for all upright mobility with use of FWW. Pt was able to complete stair training and demonstrated with safe navigation with use of railings.Pt was provided with VC and TC for appropriate quad contraction and mechanics during supine/seated therapeutic exercises. Pt was provided with education on intensity, frequency, and duration of exercises. Recommend outpatient physical therapy services to address higher level deficits.    Plan    Treatment Plan Status: (P) Continue Current Treatment Plan  Type of Treatment: Gait Training, Manual Therapy, Therapeutic Activities, Therapeutic Exercise  Treatment Frequency: Daily  Treatment Duration:      DC Equipment Recommendations: (P) None  Discharge Recommendations: (P) Recommend outpatient physical therapy services to address higher level deficits    Objective       01/11/24 0827   Precautions   Precautions Weight Bearing As Tolerated Left Lower Extremity   Pain 0 - 10 Group   Location Knee   Location Orientation Left   Description Sore   Therapist Pain Assessment Prior to Activity;During Activity;Post Activity;Nurse Notified;3   Cognition    Cognition / Consciousness WDL   Level of Consciousness Alert   Comments [place]   Supine Lower Body Exercise   Supine Lower Body Exercises Yes   Straight Leg Raises Other Resistance (See Comments)  (2 reps)   Short Arc Quad Other Resistance (See Comments)  (3 reps)   Heel Slide 1 set of 10;Left   Ankle Pumps Reciprocal;1 set of 10   Quadriceps Isometrics Other Resistance (See Comments)  (2 reps 5 sec holds)   Sitting Lower Body  Exercises   Sitting Lower Body Exercises Yes   Long Arc Quad Other Resistance (See Comments)  (2-3 reps)   Other Exercises extension stretech 30 seconds   Home Exercise Program   Home Exercise Program Patient Requires Assistance / Guarding to Complete Home Program   Balance   Sitting Balance (Static) Good   Sitting Balance (Dynamic) Good   Standing Balance (Static) Fair +   Standing Balance (Dynamic) Fair +   Weight Shift Sitting Good   Weight Shift Standing Fair   Skilled Intervention Verbal Cuing;Postural Facilitation   Comments w/fww use   Bed Mobility    Supine to Sit Modified Independent   Sit to Supine Modified Independent   Scooting Modified Independent   Skilled Intervention Verbal Cuing   Gait Analysis   Gait Level Of Assist Standby Assist   Assistive Device Front Wheel Walker   Distance (Feet) 120   # of Times Distance was Traveled 1   Deviation Step To;[place]   # of Stairs Climbed 6   Level of Assist with Stairs Standby Assist   Weight Bearing Status wbat L   Skilled Intervention Verbal Cuing;Compensatory Strategies   Functional Mobility   Sit to Stand Standby Assist   Bed, Chair, Wheelchair Transfer Standby Assist   Toilet Transfers Standby Assist   Transfer Method Stand Step   Mobility w/fww use   Skilled Intervention Verbal Cuing;Sequencing   How much difficulty does the patient currently have...   Turning over in bed (including adjusting bedclothes, sheets and blankets)? 4   Sitting down on and standing up from a chair with arms (e.g., wheelchair, bedside commode, etc.) 4   Moving from lying on back to sitting on the side of the bed? 4   How much help from another person does the patient currently need...   Moving to and from a bed to a chair (including a wheelchair)? 4   Need to walk in a hospital room? 3   Climbing 3-5 steps with a railing? 3   6 clicks Mobility Score 22   Activity Tolerance   Sitting in Chair NT   Sitting Edge of Bed 10 mins   Standing 10 mins   Comments no adverse events noted    Patient / Family Goals    Patient / Family Goal #1 Home   Short Term Goals    Short Term Goal # 1 S with transfers in 2 V   Goal Outcome # 1 Progressing as expected   Short Term Goal # 2 S with amb x 100 feet in 2 V   Goal Outcome # 2 Progressing as expected   Short Term Goal # 3 I with HEP in 2 V   Goal Outcome # 3 Progressing as expected   Education Group   Education Provided Role of Physical Therapist   Role of Physical Therapist Patient Response Patient;Acceptance;Explanation;Demonstration;Verbal Demonstration;Action Demonstration   Physical Therapy Treatment Plan   Physical Therapy Treatment Plan Continue Current Treatment Plan   Anticipated Discharge Equipment and Recommendations   DC Equipment Recommendations None   Discharge Recommendations Recommend outpatient physical therapy services to address higher level deficits   Interdisciplinary Plan of Care Collaboration   IDT Collaboration with  Nursing   Patient Position at End of Therapy In Bed;Call Light within Reach;Tray Table within Reach;Phone within Reach   Collaboration Comments aware of visit and recs   Session Information   Date / Session Number  1/11-2 (2/7, 1/16)   Priority 4

## 2024-02-14 NOTE — H&P
Surgery Orthopedic History & Physical Note    Date  1/10/2024  Primary Care Physician  Lui Patel M.D.    CC  Left knee pain    HPI  This is a 73 y.o. male who presented with left knee pain failed conservative care.  He wanted proceed with surgery for left total knee arthroplasty.  He had 1 on the right side previously.    Past Medical History:   Diagnosis Date    BPH (benign prostatic hyperplasia)     CAD (coronary artery disease)     Cataract     bilateral iol    Fracture     Glaucoma     High cholesterol     Pneumonia     Spinal stenosis        Past Surgical History:   Procedure Laterality Date    ARTHROPLASTY, KNEE, ROBOT-ASSISTED Left 1/10/2024    Procedure: LEFT TOTAL KNEE ARTHROPLASTY;  Surgeon: Wero Perez M.D.;  Location: SURGERY [facility];  Service: Ortho Robotic    PB TOTAL KNEE ARTHROPLASTY Right 03/21/2023    Procedure: RIGHT TOTAL KNEE ARTHROPLASTY;  Surgeon: Shawn Browne M.D.;  Location: SURGERY [facility];  Service: Orthopedics    CATARACT EXTRACTION WITH IOL      HAND SURGERY Left     bite by a dog; tendon damage    LUMBAR LAMINECTOMY DISKECTOMY         No current facility-administered medications for this encounter.     Current Outpatient Medications   Medication Sig Dispense Refill    aspirin 81 MG EC tablet Take 81 mg by mouth every day.      oxyCODONE immediate-release (ROXICODONE) 5 MG Tab Take 5 mg by mouth every 6 hours as needed.      celecoxib (CELEBREX) 200 MG Cap Take 1 capsule every day by mouth for 14 days. 14 Capsule 0    acetaminophen (TYLENOL) 500 MG Tab Take 2 tablets every 8 hours by mouth for 14 days. 84 Tablet 0    oxyCODONE immediate-release (ROXICODONE) 5 MG Tab Take 1 tablet every 4 hours by mouth for 7 days. 42 Tablet 0    AMOXICILLIN PO TAKE 4 CAPSULES BY MOUTH 1 HOUR BEFORE DENTAL APPOINTMENT      CELEBREX 200 MG Cap Take 1 capsule every day by oral route for 30 days.      naproxen (NAPROSYN) 250 MG Tab Take 250 mg by mouth 2 times a day with meals.       acetaminophen (TYLENOL) 500 MG Tab Take 500-1,000 mg by mouth every 6 hours as needed. 2 per day      finasteride (PROSCAR) 5 MG Tab Take 5 mg by mouth every day. Indications: Benign Enlargement of Prostate      tamsulosin (FLOMAX) 0.4 MG capsule Take 0.4 mg by mouth 2 times a day.      ibuprofen (MOTRIN) 200 MG Tab Take 400 mg by mouth every 6 hours as needed.      Cholecalciferol (VITAMIN D3) 1.25 MG (89247 UT) Cap Take  by mouth every day.      Zinc 50 MG Cap Take 50 mg by mouth every day.      VITAMIN E PO Take 400 mg by mouth.      Ascorbic Acid (VITAMIN C) 1000 MG Tab Take  by mouth every day.      Omega-3 1000 MG Cap Take  by mouth every day.      bimatoprost (LUMIGAN) 0.01 % Solution Place 1 Drop in both eyes every bedtime.         Social History     Socioeconomic History    Marital status:      Spouse name: Not on file    Number of children: Not on file    Years of education: Not on file    Highest education level: Not on file   Occupational History    Not on file   Tobacco Use    Smoking status: Never    Smokeless tobacco: Former     Types: Chew     Quit date: 12/1/2018   Vaping Use    Vaping Use: Never used   Substance and Sexual Activity    Alcohol use: Yes     Comment: 4-5 drinks a week, but not always    Drug use: No    Sexual activity: Not on file   Other Topics Concern    Not on file   Social History Narrative    ** Merged History Encounter **          Social Determinants of Health     Financial Resource Strain: Not on file   Food Insecurity: Not on file   Transportation Needs: Not on file   Physical Activity: Not on file   Stress: Not on file   Social Connections: Not on file   Intimate Partner Violence: Not on file   Housing Stability: Not on file       Family History   Problem Relation Age of Onset    Arthritis Other     Hypertension Other        Allergies  Daptomycin    Review of Systems  Negative    Physical Exam  Left knee:  Lacking extension  Valgus deformity  Tenderness  throughout  Vital Signs  Blood Pressure : 129/71   Temperature: 36.8 °C (98.3 °F)   Pulse: 77   Respiration: 16   Pulse Oximetry: 94 %       Labs:                    Radiology:  DX-CHEST-PORTABLE (1 VIEW)   Final Result      Mild cardiomegaly.      DX-KNEE 2- LEFT   Final Result      Post left knee tricompartment arthroplasty.            Assessment/Plan:  73-year-old male with left knee osteoarthritis failed conservative care    We discussed risk benefits alternatives operative and nonoperative treatment were to proceed with left total knee arthroplasty.  He will stay overnight for pain control and may require transition over to observation mission depending on his length of stay for this issue.  Respites alternatives were discussed or proceed with surgery.

## (undated) DEVICE — PIN FIXATION BONE STERILE 3.2MM X 140MM (2EA/PK)

## (undated) DEVICE — MIXER BONE CEMENT REVOLUTION - W/FEMORAL PRESSURIZER (6/CA)

## (undated) DEVICE — SUTURE 1 VICRYL PLUS CT-1 - 18 INCH (12/BX)

## (undated) DEVICE — SET EXTENSION WITH 2 PORTS (48EA/CA) ***PART #2C8610 IS A SUBSTITUTE*****

## (undated) DEVICE — SUTURE 3-0 MONOCRYL PLUS PS-1 - 27 INCH (36/BX)

## (undated) DEVICE — CHLORAPREP 26 ML APPLICATOR - ORANGE TINT(25/CA)

## (undated) DEVICE — SUTURE 2-0 MONOCRYL PLUS UNDYED CT-1 1 X 36 (36EA/BX)"

## (undated) DEVICE — BLADE SAGITTAL SAW DUAL CUT 25.0 X 90.0 X 1.27MM (1/EA)

## (undated) DEVICE — SODIUM CHL IRRIGATION 0.9% 1000ML (12EA/CA)

## (undated) DEVICE — HANDPIECE 10FT INTPLS SCT PLS IRRIGATION HAND CONTROL SET (6/PK)

## (undated) DEVICE — LENS/HOOD FOR SPACESUIT - (32/PK) PEEL AWAY FACE

## (undated) DEVICE — KIT DRAPE RIO ONE PIECE WITH POCKETS (1EA)

## (undated) DEVICE — SYSTEM NAVIGATION VIZADISC KNEE PROCEDURE TRACKING KIT (1EA)

## (undated) DEVICE — TOWEL STOP TIMEOUT SAFETY FLAG (40EA/CA)

## (undated) DEVICE — SENSOR OXIMETER ADULT SPO2 RD SET (20EA/BX)

## (undated) DEVICE — PAD PREP 24 X 48 CUFFED - (100/CA)

## (undated) DEVICE — LACTATED RINGERS INJ 1000 ML - (14EA/CA 60CA/PF)

## (undated) DEVICE — RESTRAINTS QUILTED QUICK RLS - WRIST OR ANKLE (1EA/PR)

## (undated) DEVICE — TIP INTPLS HFLO ML ORFC BTRY - (12/CS)  FOR SURGILAV

## (undated) DEVICE — TUBING CLEARLINK DUO-VENT - C-FLO (48EA/CA)

## (undated) DEVICE — DRESSING AQUACEL AG ADVANTAGE 3.5 X 10" (10EA/BX)"

## (undated) DEVICE — Device

## (undated) DEVICE — SUCTION INSTRUMENT YANKAUER BULBOUS TIP W/O VENT (50EA/CA)

## (undated) DEVICE — CANISTER SUCTION RIGID RED 1500CC (40EA/CA)

## (undated) DEVICE — HUMID-VENT HEAT AND MOISTURE EXCHANGE- (50/BX)

## (undated) DEVICE — PIN FIXATION BONE STERILE 3.2MM X 110MM (2EA/PK)

## (undated) DEVICE — SUTURE, QUILL 2 PDO 36IN

## (undated) DEVICE — SODIUM CHL. IRRIGATION 0.9% 3000ML (4EA/CA 65CA/PF)

## (undated) DEVICE — PACK TOTAL KNEE  (1/CA)

## (undated) DEVICE — ELECTRODE DUAL RETURN W/ CORD - (50/PK)

## (undated) DEVICE — DRAPE SRG LG BCK TBL DISP - 10/CA